# Patient Record
Sex: MALE | Race: WHITE | Employment: UNEMPLOYED | URBAN - METROPOLITAN AREA
[De-identification: names, ages, dates, MRNs, and addresses within clinical notes are randomized per-mention and may not be internally consistent; named-entity substitution may affect disease eponyms.]

---

## 2018-02-20 LAB
ALBUMIN SERPL-MCNC: NORMAL G/DL
ALP BLD-CCNC: NORMAL U/L
ALT SERPL-CCNC: NORMAL U/L
ANION GAP SERPL CALCULATED.3IONS-SCNC: NORMAL MMOL/L
AST SERPL-CCNC: NORMAL U/L
AVERAGE GLUCOSE: NORMAL
BILIRUB SERPL-MCNC: NORMAL MG/DL
BUN BLDV-MCNC: NORMAL MG/DL
CALCIUM SERPL-MCNC: NORMAL MG/DL
CHLORIDE BLD-SCNC: NORMAL MMOL/L
CHOLESTEROL, TOTAL: NORMAL
CHOLESTEROL/HDL RATIO: NORMAL
CO2: NORMAL
CREAT SERPL-MCNC: NORMAL MG/DL
CREATININE, URINE: 219
GFR CALCULATED: NORMAL
GLUCOSE BLD-MCNC: NORMAL MG/DL
HBA1C MFR BLD: 9.1 %
HDLC SERPL-MCNC: NORMAL MG/DL
LDL CHOLESTEROL CALCULATED: NORMAL
MICROALBUMIN/CREAT 24H UR: 56.5 MG/G{CREAT}
MICROALBUMIN/CREAT UR-RTO: 258
POTASSIUM SERPL-SCNC: NORMAL MMOL/L
SODIUM BLD-SCNC: NORMAL MMOL/L
TOTAL PROTEIN: NORMAL
TRIGL SERPL-MCNC: NORMAL MG/DL
VLDLC SERPL CALC-MCNC: NORMAL MG/DL

## 2018-03-05 LAB — DIABETIC RETINOPATHY: POSITIVE

## 2020-01-29 RX ORDER — TRIAMCINOLONE ACETONIDE 1 MG/G
CREAM TOPICAL 2 TIMES DAILY
Status: ON HOLD | COMMUNITY
Start: 2017-01-24 | End: 2021-07-16

## 2020-01-29 RX ORDER — LISINOPRIL 5 MG/1
1 TABLET ORAL DAILY
Status: ON HOLD | COMMUNITY
Start: 2018-02-27 | End: 2021-07-22 | Stop reason: HOSPADM

## 2020-01-29 RX ORDER — GABAPENTIN 300 MG/1
1 CAPSULE ORAL NIGHTLY
COMMUNITY
Start: 2017-01-24 | End: 2021-05-28

## 2020-01-29 RX ORDER — PEN NEEDLE, DIABETIC 29 G X1/2"
NEEDLE, DISPOSABLE MISCELLANEOUS
Status: ON HOLD | COMMUNITY
Start: 2017-11-27 | End: 2021-07-22 | Stop reason: SDUPTHER

## 2021-02-24 ENCOUNTER — TELEPHONE (OUTPATIENT)
Dept: ORTHOPEDIC SURGERY | Age: 41
End: 2021-02-24

## 2021-07-16 ENCOUNTER — HOSPITAL ENCOUNTER (INPATIENT)
Age: 41
LOS: 6 days | Discharge: HOME | DRG: 192 | End: 2021-07-22
Attending: INTERNAL MEDICINE | Admitting: INTERNAL MEDICINE
Payer: COMMERCIAL

## 2021-07-16 DIAGNOSIS — E10.9 TYPE 1 DIABETES MELLITUS WITHOUT COMPLICATION (HCC): Primary | ICD-10-CM

## 2021-07-16 PROBLEM — I50.23 HEART FAILURE, SYSTOLIC, WITH ACUTE DECOMPENSATION (HCC): Status: ACTIVE | Noted: 2021-07-16

## 2021-07-16 PROBLEM — I50.9 ACUTE DECOMPENSATED HEART FAILURE (HCC): Status: ACTIVE | Noted: 2021-07-16

## 2021-07-16 PROCEDURE — 2060000000 HC ICU INTERMEDIATE R&B

## 2021-07-16 RX ORDER — ONDANSETRON 2 MG/ML
4 INJECTION INTRAMUSCULAR; INTRAVENOUS EVERY 6 HOURS PRN
Status: DISCONTINUED | OUTPATIENT
Start: 2021-07-16 | End: 2021-07-22 | Stop reason: HOSPADM

## 2021-07-16 RX ORDER — SODIUM CHLORIDE 0.9 % (FLUSH) 0.9 %
10 SYRINGE (ML) INJECTION PRN
Status: DISCONTINUED | OUTPATIENT
Start: 2021-07-16 | End: 2021-07-22 | Stop reason: HOSPADM

## 2021-07-16 RX ORDER — DEXTROSE MONOHYDRATE 50 MG/ML
100 INJECTION, SOLUTION INTRAVENOUS PRN
Status: DISCONTINUED | OUTPATIENT
Start: 2021-07-16 | End: 2021-07-22 | Stop reason: HOSPADM

## 2021-07-16 RX ORDER — ACETAMINOPHEN 325 MG/1
650 TABLET ORAL EVERY 6 HOURS PRN
Status: DISCONTINUED | OUTPATIENT
Start: 2021-07-16 | End: 2021-07-22 | Stop reason: HOSPADM

## 2021-07-16 RX ORDER — SODIUM CHLORIDE 0.9 % (FLUSH) 0.9 %
5-40 SYRINGE (ML) INJECTION EVERY 12 HOURS SCHEDULED
Status: DISCONTINUED | OUTPATIENT
Start: 2021-07-17 | End: 2021-07-22 | Stop reason: HOSPADM

## 2021-07-16 RX ORDER — DEXTROSE MONOHYDRATE 25 G/50ML
12.5 INJECTION, SOLUTION INTRAVENOUS PRN
Status: DISCONTINUED | OUTPATIENT
Start: 2021-07-16 | End: 2021-07-22 | Stop reason: HOSPADM

## 2021-07-16 RX ORDER — NICOTINE POLACRILEX 4 MG
15 LOZENGE BUCCAL PRN
Status: DISCONTINUED | OUTPATIENT
Start: 2021-07-16 | End: 2021-07-22 | Stop reason: HOSPADM

## 2021-07-16 RX ORDER — ACETAMINOPHEN 650 MG/1
650 SUPPOSITORY RECTAL EVERY 6 HOURS PRN
Status: DISCONTINUED | OUTPATIENT
Start: 2021-07-16 | End: 2021-07-22 | Stop reason: HOSPADM

## 2021-07-16 RX ORDER — INSULIN GLARGINE 100 [IU]/ML
35 INJECTION, SOLUTION SUBCUTANEOUS EVERY MORNING
Status: DISCONTINUED | OUTPATIENT
Start: 2021-07-17 | End: 2021-07-22 | Stop reason: HOSPADM

## 2021-07-16 RX ORDER — INSULIN GLARGINE 100 [IU]/ML
45 INJECTION, SOLUTION SUBCUTANEOUS EVERY MORNING
COMMUNITY

## 2021-07-16 RX ORDER — METOPROLOL SUCCINATE 25 MG/1
25 TABLET, EXTENDED RELEASE ORAL DAILY
Status: DISCONTINUED | OUTPATIENT
Start: 2021-07-17 | End: 2021-07-17

## 2021-07-16 RX ORDER — SODIUM CHLORIDE 9 MG/ML
25 INJECTION, SOLUTION INTRAVENOUS PRN
Status: DISCONTINUED | OUTPATIENT
Start: 2021-07-16 | End: 2021-07-22 | Stop reason: HOSPADM

## 2021-07-16 RX ORDER — ONDANSETRON 4 MG/1
4 TABLET, ORALLY DISINTEGRATING ORAL EVERY 8 HOURS PRN
Status: DISCONTINUED | OUTPATIENT
Start: 2021-07-16 | End: 2021-07-22 | Stop reason: HOSPADM

## 2021-07-17 ENCOUNTER — APPOINTMENT (OUTPATIENT)
Dept: GENERAL RADIOLOGY | Age: 41
DRG: 192 | End: 2021-07-17
Attending: INTERNAL MEDICINE
Payer: COMMERCIAL

## 2021-07-17 PROBLEM — B34.8 PARAINFLUENZA VIRUS INFECTION: Status: ACTIVE | Noted: 2021-07-17

## 2021-07-17 PROBLEM — I42.9 CARDIOMYOPATHY (HCC): Status: ACTIVE | Noted: 2021-07-17

## 2021-07-17 PROBLEM — B34.8 RHINOVIRUS INFECTION: Status: ACTIVE | Noted: 2021-07-17

## 2021-07-17 PROBLEM — Z72.0 TOBACCO ABUSE: Status: ACTIVE | Noted: 2021-07-17

## 2021-07-17 LAB
ANION GAP SERPL CALCULATED.3IONS-SCNC: 11 MMOL/L (ref 7–16)
BUN BLDV-MCNC: 30 MG/DL (ref 6–20)
CALCIUM SERPL-MCNC: 8 MG/DL (ref 8.6–10.2)
CHLORIDE BLD-SCNC: 96 MMOL/L (ref 98–107)
CHOLESTEROL, TOTAL: 110 MG/DL (ref 0–199)
CO2: 27 MMOL/L (ref 22–29)
CREAT SERPL-MCNC: 1.1 MG/DL (ref 0.7–1.2)
GFR AFRICAN AMERICAN: >60
GFR NON-AFRICAN AMERICAN: >60 ML/MIN/1.73
GLUCOSE BLD-MCNC: 268 MG/DL (ref 74–99)
HBA1C MFR BLD: 8.5 % (ref 4–5.6)
HDLC SERPL-MCNC: 29 MG/DL
LDL CHOLESTEROL CALCULATED: 66 MG/DL (ref 0–99)
MAGNESIUM: 1.9 MG/DL (ref 1.6–2.6)
METER GLUCOSE: 136 MG/DL (ref 74–99)
METER GLUCOSE: 240 MG/DL (ref 74–99)
METER GLUCOSE: 251 MG/DL (ref 74–99)
METER GLUCOSE: 338 MG/DL (ref 74–99)
METER GLUCOSE: 384 MG/DL (ref 74–99)
POTASSIUM SERPL-SCNC: 4.3 MMOL/L (ref 3.5–5)
SODIUM BLD-SCNC: 134 MMOL/L (ref 132–146)
TRIGL SERPL-MCNC: 74 MG/DL (ref 0–149)
TSH SERPL DL<=0.05 MIU/L-ACNC: 1.29 UIU/ML (ref 0.27–4.2)
VLDLC SERPL CALC-MCNC: 15 MG/DL

## 2021-07-17 PROCEDURE — 83036 HEMOGLOBIN GLYCOSYLATED A1C: CPT

## 2021-07-17 PROCEDURE — 2060000000 HC ICU INTERMEDIATE R&B

## 2021-07-17 PROCEDURE — 71046 X-RAY EXAM CHEST 2 VIEWS: CPT

## 2021-07-17 PROCEDURE — 2500000003 HC RX 250 WO HCPCS: Performed by: INTERNAL MEDICINE

## 2021-07-17 PROCEDURE — 2580000003 HC RX 258: Performed by: INTERNAL MEDICINE

## 2021-07-17 PROCEDURE — 6370000000 HC RX 637 (ALT 250 FOR IP): Performed by: INTERNAL MEDICINE

## 2021-07-17 PROCEDURE — 80061 LIPID PANEL: CPT

## 2021-07-17 PROCEDURE — 84443 ASSAY THYROID STIM HORMONE: CPT

## 2021-07-17 PROCEDURE — 36415 COLL VENOUS BLD VENIPUNCTURE: CPT

## 2021-07-17 PROCEDURE — 83735 ASSAY OF MAGNESIUM: CPT

## 2021-07-17 PROCEDURE — 6360000002 HC RX W HCPCS: Performed by: INTERNAL MEDICINE

## 2021-07-17 PROCEDURE — 80048 BASIC METABOLIC PNL TOTAL CA: CPT

## 2021-07-17 PROCEDURE — 82962 GLUCOSE BLOOD TEST: CPT

## 2021-07-17 RX ORDER — SPIRONOLACTONE 25 MG/1
25 TABLET ORAL DAILY
Status: DISCONTINUED | OUTPATIENT
Start: 2021-07-17 | End: 2021-07-22 | Stop reason: HOSPADM

## 2021-07-17 RX ORDER — FUROSEMIDE 10 MG/ML
20 INJECTION INTRAMUSCULAR; INTRAVENOUS DAILY
Status: DISCONTINUED | OUTPATIENT
Start: 2021-07-18 | End: 2021-07-17

## 2021-07-17 RX ORDER — CARVEDILOL 6.25 MG/1
6.25 TABLET ORAL 2 TIMES DAILY WITH MEALS
Status: DISCONTINUED | OUTPATIENT
Start: 2021-07-17 | End: 2021-07-21

## 2021-07-17 RX ORDER — BUMETANIDE 0.25 MG/ML
1 INJECTION, SOLUTION INTRAMUSCULAR; INTRAVENOUS 2 TIMES DAILY
Status: DISCONTINUED | OUTPATIENT
Start: 2021-07-17 | End: 2021-07-20

## 2021-07-17 RX ADMIN — INSULIN GLARGINE 35 UNITS: 100 INJECTION, SOLUTION SUBCUTANEOUS at 08:52

## 2021-07-17 RX ADMIN — ONDANSETRON 4 MG: 2 INJECTION INTRAMUSCULAR; INTRAVENOUS at 21:29

## 2021-07-17 RX ADMIN — CARVEDILOL 6.25 MG: 6.25 TABLET, FILM COATED ORAL at 08:52

## 2021-07-17 RX ADMIN — METOPROLOL SUCCINATE 25 MG: 25 TABLET, EXTENDED RELEASE ORAL at 00:08

## 2021-07-17 RX ADMIN — BUMETANIDE 1 MG: 0.25 INJECTION, SOLUTION INTRAMUSCULAR; INTRAVENOUS at 21:23

## 2021-07-17 RX ADMIN — INSULIN LISPRO 4 UNITS: 100 INJECTION, SOLUTION INTRAVENOUS; SUBCUTANEOUS at 21:23

## 2021-07-17 RX ADMIN — Medication 10 ML: at 08:59

## 2021-07-17 RX ADMIN — INSULIN LISPRO 10 UNITS: 100 INJECTION, SOLUTION INTRAVENOUS; SUBCUTANEOUS at 16:55

## 2021-07-17 RX ADMIN — CARVEDILOL 6.25 MG: 6.25 TABLET, FILM COATED ORAL at 16:54

## 2021-07-17 RX ADMIN — SPIRONOLACTONE 25 MG: 25 TABLET ORAL at 11:26

## 2021-07-17 RX ADMIN — INSULIN LISPRO 6 UNITS: 100 INJECTION, SOLUTION INTRAVENOUS; SUBCUTANEOUS at 11:26

## 2021-07-17 RX ADMIN — ENOXAPARIN SODIUM 40 MG: 100 INJECTION SUBCUTANEOUS at 08:51

## 2021-07-17 RX ADMIN — INSULIN LISPRO 3 UNITS: 100 INJECTION, SOLUTION INTRAVENOUS; SUBCUTANEOUS at 06:44

## 2021-07-17 RX ADMIN — BUMETANIDE 1 MG: 0.25 INJECTION, SOLUTION INTRAMUSCULAR; INTRAVENOUS at 08:52

## 2021-07-17 NOTE — PROGRESS NOTES
DAILY PROGRESS NOTE - THE HEART CENTER    SUBJECTIVE:    He is being followed for acute systolic heart failure. He was admitted to [facility] last week with worsening dyspnea, orthopnea, PND and had pulmonary edema on chest x-ray. Subsequent echocardiogram showed severe left ventricular dilatation with ejection fraction 10-15% with moderate mitral regurgitation. He was placed on carvedilol for cardioprotection along with spironolactone with plans to switch his existing lisinopril to [place]. Was receiving IV bumetanide at [facility] with unknown amount of diuresis. He received a LifeVest yesterday 7/16. Feeling well at this time and with much less dyspnea. Sinus rhythm on telemetry. Of note, he did have Lexiscan at [facility] which showed extensive anteroseptal thinning with EF of 18%. No ischemia. Hypertension, longstanding insulin requiring diabetes mellitus for over 20 years. Questionable hyperlipidemia. OBJECTIVE:    His vital signs were reviewed today. Vitals:    07/17/21 0333   BP: 123/81   Pulse: 91   Resp: 22   Temp: 97.1 °F (36.2 °C)   SpO2: 92%       Scheduled Meds:   [START ON 7/18/2021] furosemide  20 mg Intravenous Daily    insulin glargine  35 Units Subcutaneous QAM    metoprolol succinate  25 mg Oral Daily    sodium chloride flush  5-40 mL Intravenous 2 times per day    enoxaparin  40 mg Subcutaneous Daily    insulin lispro  0-12 Units Subcutaneous TID WC    insulin lispro  0-6 Units Subcutaneous Nightly     Continuous Infusions:   sodium chloride      dextrose       PRN Meds:.sodium chloride flush, sodium chloride, ondansetron **OR** ondansetron, magnesium hydroxide, acetaminophen **OR** acetaminophen, glucose, dextrose, glucagon (rDNA), dextrose    REVIEW OF SYSTEMS:     No pruritus, rash, bruising. Cardiac symptoms per HPI. No nausea, vomiting, abdominal pain, GI bleeding, change in bowel habits.   No dysuria, urinary frequency, urgency, hematuria, flank pain.  Joint pain but no muscle soreness, stiffness, aching. No headache, speech disturbance, lateralizing neurologic deficit. No hemoptysis, epistaxis, easy bruising. No anxiety, depression. No symptoms of hypothyroidism, hyperthyroidism, diabetes, heat or cold intolerance. FAMILY HISTORY: Negative for CAD in first-degree relatives. SOCIAL HISTORY: Negative for alcohol, tobacco, or illicit drug use. PHYSICAL EXAM:    General Appearance:  awake, alert, oriented, in no acute distress  Neck:  no bruits  Lungs:  Normal expansion. Coarse breath sounds at bases bilaterally and faint crackles to auscultation. No rales, rhonchi, or wheezing. Heart:  Heart sounds are normal.  Regular rate and rhythm without murmur. Soft apical heave palpated and S3 heard. No murmurs. Abdomen:  Soft, non-tender. Extremities: Extremities warm to touch, pink, with no edema. Neuro/musculosketal:  Unremarkable. LABS:    Recent Labs     07/15/21  0406 07/16/21  0634   * 133*   CREATININE 1.2 1.2       Recent Labs     07/15/21  0406 07/16/21  0634   HGB 14.3 13.8       No results for input(s): INR in the last 72 hours. IMPRESSION:    #1.  Acute systolic heart failure-was on IV bumetanide 2 mg twice daily at [facility] with unknown diuresis. Change to IV bumetanide 1 mg IV twice daily. Strict urine output and daily weights. Chest x-ray this morning. #2. Severe cardiomyopathy-change metoprolol to carvedilol 6.25 mg twice daily. Start Entresto 24/26 mg twice daily tomorrow, as he will have been off of lisinopril for 36 hours. Start spironolactone 25 mg daily at noon. Plan [place] as outpatient. Probably will need right and left heart catheterization while admitted. #3. Hypertension-controlled. Medication changes as above. #4.  Diabetes-per hospitalist.    #5.  Continue to follow.

## 2021-07-17 NOTE — H&P
Hospital Medicine History & Physical      PCP: Radha Vásquez MD    Date of Admission: 7/16/2021    Date of Service: Pt seen/examined on 7/16 and Admitted to Inpatient with expected LOS greater than two midnights due to medical therapy. Chief Complaint: Cardiomyopathy      History Of Present Illness:   47 y.o. male who presented to [facility] with cardiomyopathy. Patient is transferred from [facility] after admission for CHF. Patient notes that over the last week he has had worsening shortness of breath. Associated with cough productive of clear sputum.  + Stuffy nose and sore throat. Similar to previous colds. Patient does note progressively worsening shortness of breath. Associated with leg swelling.  + Orthopnea and PND. Patient was admitted to \[facility]". He was evaluated by Cardiology and diuresed. Symptoms were moderate in severity and are largely improved. Echocardiogram reveals dilated LV with EF 10 to 15%. Patient is transferred to [address] [place] [address] for further evaluation and treatment. Patient denies family history of CHF or early CAD. He has remote history of methamphetamine use but denies any further use in the last couple years. Patient continues to smoke. He denies alcohol abuse. Past Medical History:          Diagnosis Date    Arthritis     hand, wrists    Bleeding of eye     right    CHF (congestive heart failure) (HCC)     Diabetes ([facility]     Hypertension     Neuropathy in diabetes ([facility]     legs       Past Surgical History:      No past surgical history on file. Medications Prior to Admission:      Prior to Admission medications    Medication Sig Start Date End Date Taking?  Authorizing Provider   insulin glargine (LANTUS) 100 UNIT/ML injection vial Inject 45 Units into the skin every morning   Yes Historical Provider, MD   NOVOLOG 100 UNIT/ML injection vial 3 times daily (before meals) Sliding scale 2-3 times daily after checking glucose 11/10/17 Yes Historical Provider, MD   VENTOLIN  (90 Base) MCG/ACT inhaler inhale 2 puffs by mouth every 4 to 6 hours if needed 11/1/17  Yes Historical Provider, MD   Insulin Pen Needle (PEN NEEDLES 29GX1/2\") 29G X 12MM MISC Use seven times daily with quickpen 11/27/17   Historical Provider, MD   lisinopril (PRINIVIL;ZESTRIL) 5 MG tablet Take 1 tablet by mouth daily 2/27/18   Historical Provider, MD XAVIER ULTRAFINE III SHORT PEN 31G X 8 MM MISC  11/27/17   Historical Provider, MD XAVIER INS SYRINGE 0.5CC/31GX5/16 31G X 5/16\" 0.5 ML MISC  8/31/17   Historical Provider, MD   FREESTYLE LITE strip  11/25/17   Historical Provider, MD   FREESTYLE LANCETS MISC TEST 6 TIMES A DAY 11/20/17   Historical Provider, MD   ibuprofen (ADVIL;MOTRIN) 200 MG tablet Take 200 mg by mouth every 6 hours as needed for Pain    Historical Provider, MD       Allergies:  Patient has no known allergies. Social History:      The patient currently lives home    TOBACCO:   reports that he has been smoking cigarettes. He started smoking about 23 years ago. He has a 10.00 pack-year smoking history. He has never used smokeless tobacco.  ETOH:   reports no history of alcohol use. Family History:      No CHF or early CAD        Problem Relation Age of Onset    Other Mother     Other Father     No Known Problems Sister     No Known Problems Brother        REVIEW OF SYSTEMS:   Pertinent positives as noted in the HPI. All other systems reviewed and negative. PHYSICAL EXAM:    /74   Pulse 87   Temp 98.3 °F (36.8 °C) (Temporal)   Resp 16   Ht 6' 1\" (1.854 m)   Wt 162 lb 4.8 oz (73.6 kg)   SpO2 95%   BMI 21.41 kg/m²     General appearance:  No apparent distress, appears stated age and cooperative. HEENT:  Normal cephalic, atraumatic without obvious deformity. Pupils equal, round, and reactive to light. Extra ocular muscles intact. Conjunctivae/corneas clear. Neck: Supple, with full range of motion. No jugular venous distention. Trachea midline. Respiratory:  Normal respiratory effort. Clear to auscultation, bilaterally without Rales/Wheezes/Rhonchi. Cardiovascular:  Regular rate and rhythm with normal S1/S2 without murmurs, rubs or gallops. Abdomen: Soft, non-tender, non-distended with normal bowel sounds. Musculoskeletal:  No clubbing, cyanosis or edema bilaterally. Full range of motion without deformity. Skin: Skin color, texture, turgor normal.  No rashes or lesions. Neurologic:  Neurovascularly intact without any focal sensory/motor deficits. Cranial nerves: II-XII intact, grossly non-focal.  Psychiatric:  Alert and oriented, thought content appropriate, normal insight    CXR:  I have reviewed the CXR with the following interpretation: Mild pulmonary vascular congestion  EKG:  I have reviewed the EKG with the following interpretation: NSR    Labs:     Recent Labs     07/15/21  0406 07/16/21  0634   WBC 7.2 7.2   HGB 14.3 13.8   HCT 42.1 40.3*    211     Recent Labs     07/15/21  0406 07/16/21  0634   * 133*   K 5.1* 4.9   CL 99 97*   CO2 25 28   BUN 25* 30*   CREATININE 1.2 1.2   CALCIUM 8.0* 8.0*     Recent Labs     07/15/21  0406 07/16/21  0634   AST 32 27   ALT 27 25   BILITOT 1.4 0.6   ALKPHOS 152* 135*     No results for input(s): INR in the last 72 hours.   Recent Labs     07/15/21  0406 07/15/21  0928   TROPONINI 0.04* 0.04*       Urinalysis:      Lab Results   Component Value Date    WBCUA NEGATIVE 07/15/2021    RBCUA MODERATE 07/15/2021    GLUCOSEU NEGATIVE 07/15/2021         ASSESSMENT:    Active Hospital Problems    Diagnosis Date Noted    Cardiomyopathy [facility]) [I42.9] 07/17/2021    Parainfluenza virus infection [B34.8] 07/17/2021    Rhinovirus infection [B34.8] 07/17/2021    Tobacco abuse [Z72.0] 07/17/2021    Heart failure, systolic, with acute decompensation ([facility] [I50.23] 07/16/2021    Type 1 diabetes mellitus ([address] [E10.9]     Hypertension [I10]        PLAN:  82-year-old male history of CHF, type 1 diabetes, tobacco abuse transferred from [facility] with    Acute on chronic systolic CHF/dilated cardiomyopathy  -Possible ischemic versus viral versus idiopathic  - relatively euvolemic  Admit monitored bed  IV Lasix  Monitor I's and O's  Daily weights  Monitor labs closely   Echo dilated LV EF 10-15%  Cardiology consult--needs left heart cath  LifeVest     DM Nadege, CHANTELL, A1c, SSI      Tobacco abuse  - tobacco cessation counseled     Viral URI  Isolation per protocol  Supportive care    DVT Prophylaxis: LMWH  Diet: ADULT DIET; Regular; 4 carb choices (60 gm/meal); Low Sodium (2 gm)  Code Status: Full Code    PT/OT Eval Status: ordered     Dispo -requires inpatient admission       Javan Sarabia MD    Thank you Nandini Fitch MD for the opportunity to be involved in this patient's care. If you have any questions or concerns please feel free to contact me through [address] [place] [address].   Electronically signed by Javan Sarabia MD on 7/16/2021 at 11:38 PM

## 2021-07-17 NOTE — PLAN OF CARE
Problem: Falls - Risk of:  Goal: Will remain free from falls  Description: Will remain free from falls  Outcome: Met This Shift  Goal: Absence of physical injury  Description: Absence of physical injury  Outcome: Met This Shift     Problem: Cardiac:  Goal: Ability to maintain vital signs within normal range will improve  Description: Ability to maintain vital signs within normal range will improve  Outcome: Met This Shift  Goal: Cardiovascular alteration will improve  Description: Cardiovascular alteration will improve  Outcome: Met This Shift     Problem: Health Behavior:  Goal: Will modify at least one risk factor affecting health status  Description: Will modify at least one risk factor affecting health status  Outcome: Met This Shift  Goal: Identification of resources available to assist in meeting health care needs will improve  Description: Identification of resources available to assist in meeting health care needs will improve  Outcome: Met This Shift     Problem: Physical Regulation:  Goal: Complications related to the disease process, condition or treatment will be avoided or minimized  Description: Complications related to the disease process, condition or treatment will be avoided or minimized  Outcome: Met This Shift     Problem: OXYGENATION/RESPIRATORY FUNCTION  Goal: Patient will maintain patent airway  Outcome: Met This Shift  Goal: Patient will achieve/maintain normal respiratory rate/effort  Description: Respiratory rate and effort will be within normal limits for the patient  Outcome: Met This Shift     Problem: HEMODYNAMIC STATUS  Goal: Patient has stable vital signs and fluid balance  Outcome: Met This Shift     Problem: FLUID AND ELECTROLYTE IMBALANCE  Goal: Fluid and electrolyte balance are achieved/maintained  Outcome: Met This Shift

## 2021-07-17 NOTE — PLAN OF CARE
Problem: Falls - Risk of:  Goal: Will remain free from falls  Description: Will remain free from falls  7/17/2021 1049 by Elva Cronin  Outcome: Met This Shift  7/17/2021 0125 by Ivon Shaffer RN  Outcome: Met This Shift  Goal: Absence of physical injury  Description: Absence of physical injury  7/17/2021 1049 by Elva Cronin  Outcome: Met This Shift  7/17/2021 0125 by Ivon Shaffer RN  Outcome: Met This Shift     Problem: Cardiac:  Goal: Cardiovascular alteration will improve  Description: Cardiovascular alteration will improve  7/17/2021 1049 by Elva Cronin  Outcome: Ongoing  7/17/2021 0125 by Ivon Shaffer RN  Outcome: Met This Shift

## 2021-07-17 NOTE — PROGRESS NOTES
Hospitalist Progress Note      SYNOPSIS: Patient admitted on 2021 for CHF  47 y.o. male who presented to [facility] with cardiomyopathy. Patient is transferred from [facility] after admission for CHF. Patient notes that over the last week he has had worsening shortness of breath. Associated with leg swelling.  + Orthopnea and PND. He was evaluated by Cardiology and diuresed. Echocardiogram reveals dilated LV with EF 10 to 15%. Patient is transferred to [address] [place] [address] for further evaluation and treatment. Patient denies family history of CHF or early CAD. He has remote history of methamphetamine, cocaine and Etoh use use but denies any further use in the last couple years. SUBJECTIVE:    Patient seen and examined  Denied any chest pain or worsening shortness of breath  Leg swelling significantly improved  No PND and orthopnea  No acute events overnight  Records reviewed. Stable overnight. No other overnight issues reported. Temp (24hrs), Av.9 °F (36.1 °C), Min:96.1 °F (35.6 °C), Max:98.3 °F (36.8 °C)    DIET: ADULT DIET; Regular; 4 carb choices (60 gm/meal); Low Sodium (2 gm)  CODE: Full Code    Intake/Output Summary (Last 24 hours) at 2021 1301  Last data filed at 2021 1229  Gross per 24 hour   Intake --   Output 750 ml   Net -750 ml       OBJECTIVE:    /80   Pulse 85   Temp 96.2 °F (35.7 °C) (Oral)   Resp 18   Ht 6' 1\" (1.854 m)   Wt 162 lb 4.8 oz (73.6 kg)   SpO2 96%   BMI 21.41 kg/m²     General appearance: No apparent distress, appears stated age and cooperative. HEENT:  Conjunctivae/corneas clear. Neck: Supple. No jugular venous distention. Respiratory: Clear to auscultation bilaterally, normal respiratory effort  Cardiovascular: Regular rate rhythm, normal S1-S2  Abdomen: Soft, nontender, nondistended  Musculoskeletal: No clubbing, cyanosis, no bilateral lower extremity edema. Brisk capillary refill.    Skin:  No rashes  on visible skin  Neurologic: awake, alert and following commands     ASSESSMENT AND PLAN:  1) Acute on chronic systolic CHF/dilated cardiomyopathy  -Due to Ischemic versus nonischemic cardiomyopathy  -TTE from the referring hospital showed EF of 10 to 15% with moderate mitral regurgitation  -Status post Lexiscan in [facility] which showed extensive anteroseptal septal thinning with EF of 18%.   No ischemia  -Status post lifevest placement  -Symptoms improved with diuresis  -Cardiology on board, started on carvedilol 6.25 mg twice daily, Aldactone 25 daily and Entresto to be started tomorrow after waiting more than 36 hours of lisinopril wash off  -Discussed with patient about the need for LHC during this admission; he agreed         2) DM type 2  -Continue basal, supplemental and prandial insulin coverage  -Farxiga to be started as OP due to additive benefit in patients with heart failure     3) Nicotine use disorder  -Counseled to quit          DISPOSITION: Home    Medications:  REVIEWED DAILY    Infusion Medications    sodium chloride      dextrose       Scheduled Medications    bumetanide  1 mg Intravenous BID    carvedilol  6.25 mg Oral BID WC    spironolactone  25 mg Oral Daily    insulin glargine  35 Units Subcutaneous QAM    sodium chloride flush  5-40 mL Intravenous 2 times per day    enoxaparin  40 mg Subcutaneous Daily    insulin lispro  0-12 Units Subcutaneous TID WC    insulin lispro  0-6 Units Subcutaneous Nightly     PRN Meds: sodium chloride flush, sodium chloride, ondansetron **OR** ondansetron, magnesium hydroxide, acetaminophen **OR** acetaminophen, glucose, dextrose, glucagon (rDNA), dextrose    Labs:     Recent Labs     07/15/21  0406 07/16/21  0634   WBC 7.2 7.2   HGB 14.3 13.8   HCT 42.1 40.3*    211       Recent Labs     07/15/21  0406 07/16/21  0634 07/17/21  0518   * 133* 134   K 5.1* 4.9 4.3   CL 99 97* 96*   CO2 25 28 27   BUN 25* 30* 30*   CREATININE 1.2 1.2 1.1 CALCIUM 8.0* 8.0* 8.0*       Recent Labs     07/15/21  0406 07/16/21  0634   PROT 6.0 5.8*   ALKPHOS 152* 135*   ALT 27 25   AST 32 27   BILITOT 1.4 0.6       No results for input(s): INR in the last 72 hours. Recent Labs     07/15/21  0406 07/15/21  0928   TROPONINI 0.04* 0.04*       Chronic labs:    Lab Results   Component Value Date    CHOL 110 07/17/2021    TRIG 74 07/17/2021    HDL 29 07/17/2021    LDLCALC 66 07/17/2021    TSH 1.290 07/17/2021    LABA1C 8.5 (H) 07/17/2021       Radiology: REVIEWED DAILY    +++++++++++++++++++++++++++++++++++++++++++++++++  Manpreet Bautista MD  [facility] Physician - [address] [place] [address]  +++++++++++++++++++++++++++++++++++++++++++++++++  NOTE: This report was transcribed using voice recognition software. Every effort was made to ensure accuracy; however, inadvertent computerized transcription errors may be present.

## 2021-07-18 LAB
ANION GAP SERPL CALCULATED.3IONS-SCNC: 8 MMOL/L (ref 7–16)
BUN BLDV-MCNC: 28 MG/DL (ref 6–20)
CALCIUM SERPL-MCNC: 8.1 MG/DL (ref 8.6–10.2)
CHLORIDE BLD-SCNC: 97 MMOL/L (ref 98–107)
CO2: 33 MMOL/L (ref 22–29)
CREAT SERPL-MCNC: 1.2 MG/DL (ref 0.7–1.2)
GFR AFRICAN AMERICAN: >60
GFR NON-AFRICAN AMERICAN: >60 ML/MIN/1.73
GLUCOSE BLD-MCNC: 193 MG/DL (ref 74–99)
MAGNESIUM: 2 MG/DL (ref 1.6–2.6)
METER GLUCOSE: 122 MG/DL (ref 74–99)
METER GLUCOSE: 167 MG/DL (ref 74–99)
METER GLUCOSE: 167 MG/DL (ref 74–99)
METER GLUCOSE: 413 MG/DL (ref 74–99)
METER GLUCOSE: 461 MG/DL (ref 74–99)
POTASSIUM SERPL-SCNC: 4.6 MMOL/L (ref 3.5–5)
PRO-BNP: 1520 PG/ML (ref 0–125)
SODIUM BLD-SCNC: 138 MMOL/L (ref 132–146)

## 2021-07-18 PROCEDURE — 83880 ASSAY OF NATRIURETIC PEPTIDE: CPT

## 2021-07-18 PROCEDURE — 83735 ASSAY OF MAGNESIUM: CPT

## 2021-07-18 PROCEDURE — 6370000000 HC RX 637 (ALT 250 FOR IP): Performed by: INTERNAL MEDICINE

## 2021-07-18 PROCEDURE — 82962 GLUCOSE BLOOD TEST: CPT

## 2021-07-18 PROCEDURE — 36415 COLL VENOUS BLD VENIPUNCTURE: CPT

## 2021-07-18 PROCEDURE — 2580000003 HC RX 258: Performed by: INTERNAL MEDICINE

## 2021-07-18 PROCEDURE — 6360000002 HC RX W HCPCS: Performed by: INTERNAL MEDICINE

## 2021-07-18 PROCEDURE — 80048 BASIC METABOLIC PNL TOTAL CA: CPT

## 2021-07-18 PROCEDURE — 2060000000 HC ICU INTERMEDIATE R&B

## 2021-07-18 PROCEDURE — 2500000003 HC RX 250 WO HCPCS: Performed by: INTERNAL MEDICINE

## 2021-07-18 RX ADMIN — INSULIN LISPRO 2 UNITS: 100 INJECTION, SOLUTION INTRAVENOUS; SUBCUTANEOUS at 16:21

## 2021-07-18 RX ADMIN — SACUBITRIL AND VALSARTAN 1 TABLET: 24; 26 TABLET, FILM COATED ORAL at 20:50

## 2021-07-18 RX ADMIN — INSULIN GLARGINE 35 UNITS: 100 INJECTION, SOLUTION SUBCUTANEOUS at 08:31

## 2021-07-18 RX ADMIN — ENOXAPARIN SODIUM 40 MG: 100 INJECTION SUBCUTANEOUS at 08:31

## 2021-07-18 RX ADMIN — INSULIN LISPRO 20 UNITS: 100 INJECTION, SOLUTION INTRAVENOUS; SUBCUTANEOUS at 11:37

## 2021-07-18 RX ADMIN — BUMETANIDE 1 MG: 0.25 INJECTION, SOLUTION INTRAMUSCULAR; INTRAVENOUS at 20:50

## 2021-07-18 RX ADMIN — CARVEDILOL 6.25 MG: 6.25 TABLET, FILM COATED ORAL at 16:23

## 2021-07-18 RX ADMIN — INSULIN LISPRO 12 UNITS: 100 INJECTION, SOLUTION INTRAVENOUS; SUBCUTANEOUS at 11:37

## 2021-07-18 RX ADMIN — BUMETANIDE 1 MG: 0.25 INJECTION, SOLUTION INTRAMUSCULAR; INTRAVENOUS at 08:31

## 2021-07-18 RX ADMIN — Medication 10 ML: at 20:50

## 2021-07-18 RX ADMIN — Medication 10 ML: at 08:37

## 2021-07-18 RX ADMIN — INSULIN LISPRO 2 UNITS: 100 INJECTION, SOLUTION INTRAVENOUS; SUBCUTANEOUS at 06:17

## 2021-07-18 RX ADMIN — SACUBITRIL AND VALSARTAN 1 TABLET: 24; 26 TABLET, FILM COATED ORAL at 09:40

## 2021-07-18 RX ADMIN — CARVEDILOL 6.25 MG: 6.25 TABLET, FILM COATED ORAL at 08:31

## 2021-07-18 RX ADMIN — SPIRONOLACTONE 25 MG: 25 TABLET ORAL at 08:31

## 2021-07-18 RX ADMIN — INSULIN LISPRO 20 UNITS: 100 INJECTION, SOLUTION INTRAVENOUS; SUBCUTANEOUS at 16:21

## 2021-07-18 NOTE — PLAN OF CARE
Problem: Falls - Risk of:  Goal: Will remain free from falls  Description: Will remain free from falls  7/17/2021 2331 by Raghu De Oliveira RN  Outcome: Met This Shift  7/17/2021 1049 by Tiffani Rutherford  Outcome: Met This Shift  Goal: Absence of physical injury  Description: Absence of physical injury  7/17/2021 2331 by Raghu De Oliveira RN  Outcome: Met This Shift  7/17/2021 1049 by Tiffani Rutherford  Outcome: Met This Shift

## 2021-07-18 NOTE — CONSULTS
Nutrition Education    · Heart healthy diet guidelines provided to patient/attached within the discharge instructions along w/ the outpatient dietitian contact information for any additional guidance if needed.     Electronically signed by Nilsa Polanco MS, RD, LD on 7/18/21 at 10:38 AM EDT    Contact: [phone]

## 2021-07-18 NOTE — PLAN OF CARE
Problem: Falls - Risk of:  Goal: Will remain free from falls  Description: Will remain free from falls  7/18/2021 1001 by Deep Bisunshineu  Outcome: Met This Shift  7/17/2021 2331 by Erick Burton RN  Outcome: Met This Shift  Goal: Absence of physical injury  Description: Absence of physical injury  7/18/2021 1001 by [place] Bijou  Outcome: Met This Shift  7/17/2021 2331 by Erick Burton RN  Outcome: Met This Shift     Problem: Cardiac:  Goal: Ability to maintain vital signs within normal range will improve  Description: Ability to maintain vital signs within normal range will improve  7/18/2021 1001 by [place] Bijou  Outcome: Ongoing  7/17/2021 2331 by Erick Burton RN  Outcome: Met This Shift  Goal: Cardiovascular alteration will improve  Description: Cardiovascular alteration will improve  7/18/2021 1001 by Deep Liannau  Outcome: Ongoing  7/17/2021 2331 by Erick Burton RN  Outcome: Met This Shift

## 2021-07-18 NOTE — DISCHARGE INSTR - DIET
 Good nutrition is important when healing from an illness, injury, or surgery. Follow any nutrition recommendations given to you during your hospital stay.  If you were given an oral nutrition supplement while in the hospital, continue to take this supplement at home. You can take it with meals, in-between meals, and/or before bedtime. These supplements can be purchased at most local grocery stores, pharmacies, and chain super-stores.  If you have any questions about your diet or nutrition, call the hospital and ask for the dietitian. Outpatient dietitian  [phone]      A heart-healthy and consistent carbohydrate diet is recommended to manage heart disease and diabetes. To follow a heart-healthy and consistent carbohydrate diet,    Eat a balanced diet with whole grains, fruits and vegetables, and lean protein sources. Choose heart-healthy unsaturated fats. Limit saturated fats, trans fats, and cholesterol intake. Eat more plant-based or vegetarian meals using beans and soy foods for protein. Eat whole, unprocessed foods to limit the amount of sodium (salt) you eat. Choose a consistent amount of carbohydrate at each meal and snack. Limit refined carbohydrates especially sugar, sweets and sugar-sweetened beverages. If you drink alcohol, do so in moderation: one serving per day (women) and two servings per day (men). o One serving is equivalent to 12 ounces beer, 5 ounces wine, or 1.5 ounces distilled spirits    Tips  Tips for Choosing Heart-Healthy Fats  Choose lean protein and low-fat dairy foods to reduce saturated fat intake. Saturated fat is usually found in animal-based protein and is associated with certain health risks. Saturated fat is the biggest contributor to raise low-density lipoprotein (LDL) cholesterol levels. Research shows that limiting saturated fat lowers unhealthy cholesterol levels. Eat no more than 7% of your total calories each day from saturated fat.  Ask your RDN to help you determine how much saturated fat is right for you. There are many foods that do not contain large amounts of saturated fats. Swapping these foods to replace foods high in saturated fats will help you limit the saturated fat you eat and improve your cholesterol levels. You can also try eating more plant-based or vegetarian meals. Instead of    Try:    Whole milk, cheese, yogurt, and ice cream    1% or skim milk, low-fat cheese, non-fat yogurt, and low-fat ice cream    Fatty, marbled beef and pork    Lean beef, pork, or venison    Poultry with skin    Poultry without skin    Butter, stick margarine    Reduced-fat, whipped, or liquid spreads    Coconut oil, palm oil    Liquid vegetable oils: corn, canola, olive, soybean and safflower oils      Avoid foods that contain trans fats. Trans fats increase levels of LDL-cholesterol. Hydrogenated fat in processed foods is the main source of trans fats in foods. Trans fats can be found in stick margarine, shortening, processed sweets, baked goods, some fried foods, and packaged foods made with hydrogenated oils. Avoid foods with partially hydrogenated oil on the ingredient list such as: cookies, pastries, baked goods, biscuits, crackers, microwave popcorn, and frozen dinners. Choose foods with heart healthy fats. Polyunsaturated and monounsaturated fat are unsaturated fats that may help lower your blood cholesterol level when used in place of saturated fat in your diet. Ask your RDN about taking a dietary supplement with plant sterols and stanols to help lower your cholesterol level. Research shows that substituting saturated fats with unsaturated fats is beneficial to cholesterol levels. Try these easy swaps:        Instead of    Try:    Butter, stick margarine, or solid shortening    Reduced-fat, whipped, or liquid spreads    Beef, pork, or poultry with skin         Fish and seafood    Chips, crackers, snack foods    Raw or unsalted nuts and seeds or nut butters    Hummus with vegetables    Avocado on toast    Coconut oil, palm oil    Liquid vegetable oils: corn, canola, olive, soybean and safflower oils         Limit the amount of cholesterol you eat to less than 200 milligrams per day. Cholesterol is a substance carried through the bloodstream via lipoproteins, which are known as transporters of fat. Some body functions need cholesterol to work properly, but too much cholesterol in the bloodstream can damage arteries and build up blood vessel linings (which can lead to heart attack and stroke). You should eat less than 200 milligrams cholesterol per day. People respond differently to eating cholesterol. There is no test available right now that can figure out which people will respond more to dietary cholesterol and which will respond less. For individuals with high intake of dietary cholesterol, different types of increase (none, small, moderate, large) in LDL-cholesterol levels are all possible. Food sources of cholesterol include egg yolks and organ meats such as liver, gizzards. Limit egg yolks to two to four per week and avoid organ meats like liver and gizzards to control cholesterol intake. Tips for Choosing Heart-Healthy Carbohydrates  Consume a consistent amount of carbohydrate  It is important to eat foods with carbohydrates in moderation because they impact your blood glucose level. Carbohydrates can be found in many foods such as:  Grains (breads, crackers, rice, pasta, and cereals)  Starchy Vegetables (potatoes, corn, and peas)  Beans and legumes  Milk, soy milk, and yogurt  Fruit and fruit juice  Sweets (cakes, cookies, ice cream, jam and jelly)  Your RDN will help you set a goal for how many carbohydrate servings to eat at your meals and snacks. For many adults, eating 3 to 5 servings of carbohydrate foods at each meal and 1 or 2 carbohydrate servings for each snack works well. Check your blood glucose level regularly.  It can tell you if you need to adjust when you eat carbohydrates. Choose foods rich in viscous (soluble) fiber  Viscous, or soluble, is found in the walls of plant cells. Viscous fiber is found only in plant-based foods. Eating foods with fiber helps to lower your unhealthy cholesterol and keep your blood glucose in range  Rich sources of viscous fiber include vegetables (asparagus, [place] sprouts, sweet potatoes, turnips) fruit (apricots, mangoes, oranges), legumes, and whole grains (barley, oats, and oat bran). As you increase your fiber intake gradually, also increase the amount of water you drink. This will help prevent constipation. If you have difficulty achieving this goal, ask your RDN about fiber laxatives. Choose fiber supplements made with viscous fibers such as psyllium seed husks or methylcellulose to help lower unhealthy cholesterol. Limit refined carbohydrates  There are three types of carbohydrates: starches, sugar, and fiber. Some carbohydrates occur naturally in food, like the starches in rice or corn or the sugars in fruits and milk. Refined carbohydrates--foods with high amounts of simple sugars--can raise triglyceride levels. High triglyceride levels are associated with coronary heart disease. Some examples of refined carbohydrate foods are table sugar, sweets, and beverages sweetened with added sugar. Tips for Reducing Sodium (Salt)  Although sodium is important for your body to function, too much sodium can be harmful for people with high blood pressure. As sodium and fluid buildup in your tissues and bloodstream, your blood pressure increases. High blood pressure may cause damage to other organs and increase your risk for a stroke. Even if you take a pill for blood pressure or a water pill (diuretic) to remove fluid, it is still important to have less salt in your diet. Ask your doctor and RDN what amount of sodium is right for you. Avoid processed foods. Eat more fresh foods.   Fresh fruits and vegetables are naturally low in sodium, as well as frozen vegetables and fruits that have no added juices or sauces. Fresh meats are lower in sodium than processed meats, such as araujo, sausage, and hotdogs. Read the nutrition label or ask your  to help you find a fresh meat that is low in sodium. Eat less salt--at the table and when cooking. A single teaspoon of table salt has 2,300 mg of sodium. Leave the salt out of recipes for pasta, casseroles, and soups. Ask your RDN how to cook your favorite recipes without sodium  Be a smart . Look for food packages that say salt-free or sodium-free.  These items contain less than 5 milligrams of sodium per serving. Very low-sodium products contain less than 35 milligrams of sodium per serving. Low-sodium products contain less than 140 milligrams of sodium per serving. Beware for Unsalted or No Added Salt products. These items may still be high in sodium. Check the nutrition label. Add flavors to your food without adding sodium. Try lemon juice, lime juice, fruit juice or vinegar. Dry or fresh herbs add flavor. Try basil, bay leaf, dill, rosemary, parsley, taylor, dry mustard, nutmeg, thyme, and paprika. Pepper, red pepper flakes, and cayenne pepper can add spice t your meals without adding sodium. Hot sauce contains sodium, but if you use just a drop or two, it will not add up to much. Buy a sodium-free seasoning blend or make your own at home. Additional Lifestyle Tips  Achieve and maintain a healthy weight. Talk with your RDN or your doctor about what is a healthy weight for you. Set goals to reach and maintain that weight. To lose weight, reduce your calorie intake along with increasing your physical activity. A weight loss of 10 to 15 pounds could reduce LDL-cholesterol by 5 milligrams per deciliter. Participate in physical activity.     Talk with your health care team to find out what types of physical activity are best for you. Set a plan to get about 30 minutes of exercise on most days.   Foods Recommended  Food Group    Foods Recommended    Grains    Whole grain breads and cereals, including whole wheat, barley, rye, buckwheat, corn, teff, quinoa, millet, amaranth, brown or wild rice, sorghum, and oats  Pasta, especially whole wheat or other whole grain types  [place], quinoa or wild rice  Whole grain crackers, bread, rolls, pitas  Home-made bread with reduced-sodium baking soda    Protein Foods    Lean cuts of beef and pork (loin, leg, round, extra lean hamburger)  Skinless poultry  Fish  Venison and other wild game  Dried beans and peas  Nuts and nut butters  Meat alternatives made with soy or textured vegetable protein  Egg whites or egg substitute  Cold cuts made with lean meat or soy protein    Dairy    Nonfat (skim), low-fat, or 1%-fat milk  Nonfat or low-fat yogurt or cottage cheese  Fat-free and low-fat cheese    Vegetables    Fresh, frozen, or canned vegetables without added fat or salt    Fruits    Fresh, frozen, canned, or dried fruit    Oils    Unsaturated oils (corn, olive, peanut, soy, sunflower, canola)  Soft or liquid margarines and vegetable oil spreads  Salad dressings  Seeds and nuts  Avocado    Foods Not Recommended  Food Group    Foods Not Recommended    Grains    Breads or crackers topped with salt  Cereals (hot or cold) with more than 300 mg sodium per serving  Biscuits, cornbread, and other quick breads prepared with baking soda  Bread crumbs or stuffing mix from a store  High-fat bakery products, such as doughnuts, biscuits, croissants, [place] pastries, pies, cookies  Instant cooking foods to which you add hot water and stir--potatoes, noodles, rice, etc.  Packaged starchy foods--seasoned noodle or rice dishes, stuffing mix, macaroni and cheese dinner  Snacks made with partially hydrogenated oils, including chips, cheese puffs, snack mixes, regular crackers, butter-flavored popcorn    Protein Foods    Higher-fat cuts of meats (ribs, t-bone steak, regular hamburger)  Costello, sausage, or hot dogs  Cold cuts, such as salami or bologna, deli meats, cured meats, corned beef  Organ meats (liver, brains, gizzards, sweetbreads)  Poultry with skin  Fried or smoked meat, poultry, and fish  Whole eggs and egg yolks (more than 2-4 per week)  Salted legumes, nuts, seeds, or nut/seed butters  Meat alternatives with high levels of sodium (>300 mg per serving) or saturated fat (>5 g per serving)    Dairy    Whole milk,?2% fat milk, buttermilk  Whole milk yogurt or ice cream  Cream  Half-&-half  Cream cheese  Sour cream  Cheese    Vegetables    Canned or frozen vegetables with salt, fresh vegetables prepared with salt, butter, cheese, or cream sauce  Fried vegetables  Pickled vegetables such as olives, pickles, or sauerkraut    Fruits    Fried fruits  Fruits served with butter or cream    Oils    Butter, stick margarine, shortening  Partially hydrogenated oils or trans fats  Tropical oils (coconut, palm, palm kernel oils)    Other    Candy, sugar sweetened soft drinks and desserts  Salt, sea salt, garlic salt, and seasoning mixes containing salt  Bouillon cubes  Ketchup, barbecue sauce, Worcestershire sauce, soy sauce, teriyaki sauce  Miso  Salsa  Pickles, olives, relish    Heart Healthy Consistent Carbohydrate Sample 1-Day Menu   Breakfast  1 cup cooked oatmeal (2 carbohydrate servings)  3/4 cup blueberries (1 carbohydrate serving)  1 ounce almonds  1 cup skim milk (1 carbohydrate serving)  1 cup coffee  Morning Snack  1 cup sugar-free nonfat yogurt (1 carbohydrate serving)  Lunch  2 slices whole-wheat bread (2 carbohydrate servings)  2 ounces lean turkey breast  1 ounce low-fat Swiss cheese  1 teaspoon mustard  1 slice tomato  1 lettuce leaf  1 small pear (1 carbohydrate serving)  1 cup skim milk (1 carbohydrate serving)  Afternoon Snack  1 ounce trail mix with unsalted nuts, seeds, and raisins (1 carbohydrate serving)  Evening Meal  3 ounces salmon  2/3 cup cooked brown rice (2 carbohydrate servings)  1 teaspoon soft margarine  1 cup cooked broccoli with 1/2 cup cooked carrots (1 carbohydrate serving  Carrots, cooked, boiled, drained, without salt  1 cup lettuce  1 teaspoon olive oil with vinegar for dressing  1 small whole grain roll (1 carbohydrate serving)  1 teaspoon soft margarine  1 cup unsweetened tea  Evening Snack  1 extra-small banana (1 carbohydrate serving)

## 2021-07-18 NOTE — PROGRESS NOTES
Hospitalist Progress Note      SYNOPSIS: Patient admitted on 2021 for CHF  47 y.o. male who presented to [address] [place] [address] with cardiomyopathy. Patient is transferred from [facility] after [facility] for [facility]F. Patient notes that over the last week he has had worsening shortness of breath. Associated with leg swelling.  + Orthopnea and PND. He was evaluated by Cardiology and diuresed. Echocardiogram reveals dilated LV with EF 10 to 15%. Patient is transferred to [address] [place] [address] for further evaluation and treatment. Patient denies family history of CHF or early CAD. He has remote history of methamphetamine, cocaine and Etoh use use but denies any further use in the last couple years. SUBJECTIVE:    Patient seen and examined  Denied any chest pain or worsening shortness of breath  Leg swelling significantly improved  No PND and orthopnea  No acute events overnight  Records reviewed. Stable overnight. No other overnight issues reported. Temp (24hrs), Av °F (36.1 °C), Min:96.2 °F (35.7 °C), Max:98 °F (36.7 °C)    DIET: ADULT DIET; Regular; 4 carb choices (60 gm/meal); Low Sodium (2 gm)  CODE: Full Code    Intake/Output Summary (Last 24 hours) at 2021 0953  Last data filed at 2021 0741  Gross per 24 hour   Intake 120 ml   Output 2450 ml   Net -2330 ml       OBJECTIVE:    /88   Pulse 85   Temp 96.9 °F (36.1 °C) (Temporal)   Resp 20   Ht 6' 1\" (1.854 m)   Wt 160 lb 3 oz (72.7 kg)   SpO2 97%   BMI 21.13 kg/m²     General appearance: No apparent distress, appears stated age and cooperative. HEENT:  Conjunctivae/corneas clear. Neck: Supple. No jugular venous distention. Respiratory: Clear to auscultation bilaterally, normal respiratory effort  Cardiovascular: Regular rate rhythm, normal S1-S2  Abdomen: Soft, nontender, nondistended  Musculoskeletal: No clubbing, cyanosis, no bilateral lower extremity edema. Brisk capillary refill.    Skin:  No rashes  on visible skin  Neurologic: awake, alert and following commands     ASSESSMENT AND PLAN:  1) Acute on chronic systolic CHF/dilated cardiomyopathy  -Due to Ischemic versus nonischemic cardiomyopathy  -TTE from the referring hospital showed EF of 10 to 15% with moderate mitral regurgitation  -Status post Lexiscan in [facility] hospital which showed extensive anteroseptal septal thinning with EF of 18%.   No ischemia  -Status post lifevest placement  -Symptoms improved with diuresis  -Cardiology on board, started on carvedilol 6.25 mg twice daily, Aldactone 25 daily and Entresto   -Plan is for [facility] later in the week per cardiology       2) DM type 2  -Continue basal, supplemental and prandial insulin coverage  -Farxiga to be started as OP due to additional benefit in patients with heart failure     3) Nicotine use disorder  -Counseled to quit          DISPOSITION: Home    Medications:  REVIEWED DAILY    Infusion Medications    sodium chloride      dextrose       Scheduled Medications    sacubitril-valsartan  1 tablet Oral BID    bumetanide  1 mg Intravenous BID    carvedilol  6.25 mg Oral BID WC    spironolactone  25 mg Oral Daily    insulin glargine  35 Units Subcutaneous QAM    sodium chloride flush  5-40 mL Intravenous 2 times per day    enoxaparin  40 mg Subcutaneous Daily    insulin lispro  0-12 Units Subcutaneous TID WC    insulin lispro  0-6 Units Subcutaneous Nightly     PRN Meds: sodium chloride flush, sodium chloride, ondansetron **OR** ondansetron, magnesium hydroxide, acetaminophen **OR** acetaminophen, glucose, dextrose, glucagon (rDNA), dextrose    Labs:     Recent Labs     07/16/21  0634   WBC 7.2   HGB 13.8   HCT 40.3*          Recent Labs     07/16/21  0634 07/17/21  0518 07/18/21  0649   * 134 138   K 4.9 4.3 4.6   CL 97* 96* 97*   CO2 28 27 33*   BUN 30* 30* 28*   CREATININE 1.2 1.1 1.2   CALCIUM 8.0* 8.0* 8.1*       Recent Labs     07/16/21  0634   PROT 5.8*   ALKPHOS 135*   ALT 25 AST 27   BILITOT 0.6       No results for input(s): INR in the last 72 hours. No results for input(s): Jodie Holly in the last 72 hours. Chronic labs:    Lab Results   Component Value Date    CHOL 110 07/17/2021    TRIG 74 07/17/2021    HDL 29 07/17/2021    LDLCALC 66 07/17/2021    TSH 1.290 07/17/2021    LABA1C 8.5 (H) 07/17/2021       Radiology: REVIEWED DAILY    +++++++++++++++++++++++++++++++++++++++++++++++++  Toya Chandra MD  [address]  +++++++++++++++++++++++++++++++++++++++++++++++++  NOTE: This report was transcribed using voice recognition software. Every effort was made to ensure accuracy; however, inadvertent computerized transcription errors may be present.

## 2021-07-18 NOTE — PROGRESS NOTES
DAILY PROGRESS NOTE - [facility]    SUBJECTIVE:    He is being followed for acute systolic heart failure. Admitted to [facility] last week with pulmonary edema requiring diuresis. Echo showed severe LV dilatation with EF 10 to 15% and moderate mitral regurgitation. Leandrew Heys showed extensive anteroseptal thinning and EF 18%. LifeVest placed at [facility] and he was transferred here for further care. 2 L negative and chest x-ray yesterday morning showed no further pulmonary edema. Hypertension, longstanding insulin requiring diabetes mellitus for over 20 years. Questionable hyperlipidemia. OBJECTIVE:    His vital signs were reviewed today. Vitals:    07/18/21 0730   BP: 131/88   Pulse: 85   Resp: 20   Temp: 96.9 °F (36.1 °C)   SpO2: 97%       Scheduled Meds:   bumetanide  1 mg Intravenous BID    carvedilol  6.25 mg Oral BID WC    spironolactone  25 mg Oral Daily    insulin glargine  35 Units Subcutaneous QAM    sodium chloride flush  5-40 mL Intravenous 2 times per day    enoxaparin  40 mg Subcutaneous Daily    insulin lispro  0-12 Units Subcutaneous TID WC    insulin lispro  0-6 Units Subcutaneous Nightly     Continuous Infusions:   sodium chloride      dextrose       PRN Meds:.sodium chloride flush, sodium chloride, ondansetron **OR** ondansetron, magnesium hydroxide, acetaminophen **OR** acetaminophen, glucose, dextrose, glucagon (rDNA), dextrose    REVIEW OF SYSTEMS:     No pruritus, rash, bruising. Cardiac symptoms per HPI. No nausea, vomiting, abdominal pain, GI bleeding, change in bowel habits. No dysuria, urinary frequency, urgency, hematuria, flank pain. Joint pain but no muscle soreness, stiffness, aching. No headache, speech disturbance, lateralizing neurologic deficit. No hemoptysis, epistaxis, easy bruising. No anxiety, depression. No symptoms of hypothyroidism, hyperthyroidism, diabetes, heat or cold intolerance.     FAMILY HISTORY: Negative for CAD in first-degree relatives. SOCIAL HISTORY: Negative for alcohol, tobacco, or illicit drug use. PHYSICAL EXAM:    General Appearance:  awake, alert, oriented, in no acute distress  Neck:  no bruits  Lungs:  Normal expansion. Coarse breath sounds at bases bilaterally and faint crackles to auscultation. No rales, rhonchi, or wheezing. Heart:  Heart sounds are normal.  Regular rate and rhythm without murmur. Soft apical heave palpated and S3 heard. No murmurs. Abdomen:  Soft, non-tender. Extremities: Extremities warm to touch, pink, with no edema. Neuro/musculosketal:  Unremarkable. LABS:    Recent Labs     07/16/21  0634 07/17/21  0518 07/18/21  0649   * 134 138   CREATININE 1.2 1.1 1.2       Recent Labs     07/16/21  0634   HGB 13.8       No results for input(s): INR in the last 72 hours. IMPRESSION:    #1.  Acute systolic heart failure-now euvolemic and have stopped the IV bumetanide and changed it to oral bumetanide 1 mg twice daily. #2.  Severe cardiomyopathy-continue carvedilol and add Entresto 24/26 mg twice daily to spironolactone which he already is on. Will likely need right and left heart cardiac catheterization once he is euvolemic. Plan to perform later on this week. #3. Hypertension-controlled. Medication changes as above. #4.  Diabetes-per hospitalist.    #5.  Continue to follow.

## 2021-07-19 LAB
ANION GAP SERPL CALCULATED.3IONS-SCNC: 8 MMOL/L (ref 7–16)
BUN BLDV-MCNC: 29 MG/DL (ref 6–20)
CALCIUM SERPL-MCNC: 7.9 MG/DL (ref 8.6–10.2)
CHLORIDE BLD-SCNC: 94 MMOL/L (ref 98–107)
CO2: 29 MMOL/L (ref 22–29)
CREAT SERPL-MCNC: 0.9 MG/DL (ref 0.7–1.2)
GFR AFRICAN AMERICAN: >60
GFR NON-AFRICAN AMERICAN: >60 ML/MIN/1.73
GLUCOSE BLD-MCNC: 396 MG/DL (ref 74–99)
MAGNESIUM: 2 MG/DL (ref 1.6–2.6)
METER GLUCOSE: 221 MG/DL (ref 74–99)
METER GLUCOSE: 252 MG/DL (ref 74–99)
METER GLUCOSE: 291 MG/DL (ref 74–99)
METER GLUCOSE: 409 MG/DL (ref 74–99)
METER GLUCOSE: 436 MG/DL (ref 74–99)
POTASSIUM SERPL-SCNC: 4.9 MMOL/L (ref 3.5–5)
SODIUM BLD-SCNC: 131 MMOL/L (ref 132–146)

## 2021-07-19 PROCEDURE — 6370000000 HC RX 637 (ALT 250 FOR IP): Performed by: INTERNAL MEDICINE

## 2021-07-19 PROCEDURE — 36415 COLL VENOUS BLD VENIPUNCTURE: CPT

## 2021-07-19 PROCEDURE — 2580000003 HC RX 258: Performed by: INTERNAL MEDICINE

## 2021-07-19 PROCEDURE — 6360000002 HC RX W HCPCS: Performed by: INTERNAL MEDICINE

## 2021-07-19 PROCEDURE — 2060000000 HC ICU INTERMEDIATE R&B

## 2021-07-19 PROCEDURE — 82962 GLUCOSE BLOOD TEST: CPT

## 2021-07-19 PROCEDURE — 80048 BASIC METABOLIC PNL TOTAL CA: CPT

## 2021-07-19 PROCEDURE — 83735 ASSAY OF MAGNESIUM: CPT

## 2021-07-19 PROCEDURE — 2500000003 HC RX 250 WO HCPCS: Performed by: INTERNAL MEDICINE

## 2021-07-19 RX ADMIN — CARVEDILOL 6.25 MG: 6.25 TABLET, FILM COATED ORAL at 17:52

## 2021-07-19 RX ADMIN — SACUBITRIL AND VALSARTAN 1 TABLET: 24; 26 TABLET, FILM COATED ORAL at 21:54

## 2021-07-19 RX ADMIN — INSULIN LISPRO 12 UNITS: 100 INJECTION, SOLUTION INTRAVENOUS; SUBCUTANEOUS at 06:43

## 2021-07-19 RX ADMIN — BUMETANIDE 1 MG: 0.25 INJECTION, SOLUTION INTRAMUSCULAR; INTRAVENOUS at 09:47

## 2021-07-19 RX ADMIN — INSULIN LISPRO 4 UNITS: 100 INJECTION, SOLUTION INTRAVENOUS; SUBCUTANEOUS at 12:22

## 2021-07-19 RX ADMIN — BUMETANIDE 1 MG: 0.25 INJECTION, SOLUTION INTRAMUSCULAR; INTRAVENOUS at 21:54

## 2021-07-19 RX ADMIN — Medication 10 ML: at 21:54

## 2021-07-19 RX ADMIN — INSULIN LISPRO 4 UNITS: 100 INJECTION, SOLUTION INTRAVENOUS; SUBCUTANEOUS at 17:52

## 2021-07-19 RX ADMIN — Medication 10 ML: at 09:59

## 2021-07-19 RX ADMIN — ENOXAPARIN SODIUM 40 MG: 100 INJECTION SUBCUTANEOUS at 09:50

## 2021-07-19 RX ADMIN — CARVEDILOL 6.25 MG: 6.25 TABLET, FILM COATED ORAL at 09:48

## 2021-07-19 RX ADMIN — SPIRONOLACTONE 25 MG: 25 TABLET ORAL at 09:49

## 2021-07-19 RX ADMIN — INSULIN GLARGINE 35 UNITS: 100 INJECTION, SOLUTION SUBCUTANEOUS at 09:50

## 2021-07-19 RX ADMIN — SACUBITRIL AND VALSARTAN 1 TABLET: 24; 26 TABLET, FILM COATED ORAL at 09:49

## 2021-07-19 NOTE — PROGRESS NOTES
Medications:  Current Facility-Administered Medications   Medication Dose Route Frequency Provider Last Rate Last Admin    sacubitril-valsartan (ENTRESTO) 24-26 MG per tablet 1 tablet  1 tablet Oral BID Farzaneh Mishra MD   1 tablet at 07/19/21 0949    insulin lispro (HUMALOG) injection vial 20 Units  20 Units Subcutaneous TID  Amanda Herrera MD   20 Units at 07/18/21 1621    bumetanide (BUMEX) injection 1 mg  1 mg Intravenous BID Farzaneh Mishra MD   1 mg at 07/19/21 0947    carvedilol (COREG) tablet 6.25 mg  6.25 mg Oral BID  Farzaneh Mishra MD   6.25 mg at 07/19/21 0948    spironolactone (ALDACTONE) tablet 25 mg  25 mg Oral Daily Farzaneh Mishra MD   25 mg at 07/19/21 0949    insulin glargine (LANTUS) injection vial 35 Units  35 Units Subcutaneous QAM Amanda Herrera MD   35 Units at 07/19/21 0950    sodium chloride flush 0.9 % injection 5-40 mL  5-40 mL Intravenous 2 times per day Triston Rodriguez MD   10 mL at 07/19/21 0959    sodium chloride flush 0.9 % injection 10 mL  10 mL Intravenous PRN Triston Rodriguez MD        0.9 % sodium chloride infusion  25 mL Intravenous PRN Triston Rodriguez MD        ondansetron (ZOFRAN-ODT) disintegrating tablet 4 mg  4 mg Oral Q8H PRN Triston Rodriguez MD        Or    ondansetron [facility]) injection 4 mg  4 mg Intravenous Q6H PRN Triston Rodriguez MD   4 mg at 07/17/21 2129    magnesium hydroxide (MILK OF MAGNESIA) 400 MG/5ML suspension 30 mL  30 mL Oral Daily PRN Triston Rodriguez MD        acetaminophen (TYLENOL) tablet 650 mg  650 mg Oral Q6H PRN Triston Rodriguez MD        Or    acetaminophen (TYLENOL) suppository 650 mg  650 mg Rectal Q6H PRN Triston Rodriguez MD        enoxaparin (LOVENOX) injection 40 mg  40 mg Subcutaneous Daily Triston Rodriguez MD   40 mg at 07/19/21 0950    glucose (GLUTOSE) 40 % oral gel 15 g  15 g Oral PRN Triston Rodriguez MD        dextrose 50 % IV solution  12.5 g Intravenous PRN Lillian Jara MD        glucagon (rDNA) injection 1 mg  1 mg Intramuscular PRN Lillian Jara MD        dextrose 5 % solution  100 mL/hr Intravenous PRN Lillian Jara MD        insulin lispro (HUMALOG) injection vial 0-12 Units  0-12 Units Subcutaneous TID WC Lillian Jara MD   12 Units at 07/19/21 6968    insulin lispro (HUMALOG) injection vial 0-6 Units  0-6 Units Subcutaneous Nightly Lillian Jara MD   4 Units at 07/17/21 2123      sodium chloride      dextrose         Physical Exam:  /89   Pulse 89   Temp 96.6 °F (35.9 °C) (Temporal)   Resp 20   Ht 6' 1\" (1.854 m)   Wt 164 lb 6 oz (74.6 kg)   SpO2 96%   BMI 21.69 kg/m²   Weight change: 4 lb 3 oz (1.899 kg)  Wt Readings from Last 3 Encounters:   07/19/21 164 lb 6 oz (74.6 kg)   11/28/17 175 lb (79.4 kg)     General: Awake, alert, oriented x3, no acute distress  Neck: No JVD, carotid bruits, thyromegaly, or lymphadenopathy  Cardiac: Regular rate and rhythm, normal S1 and split S2, no murmurs, no S3 or S4, no pericardial rubs. Carotid upstrokes brisk  Resp: clear bilaterally without wheezes, rhonchi, or rales; unlabored respirations  Abdomen: soft, nontender, nondistended, BS+; no masses, bruits, or hepatomegaly  Extremities: no cyanosis, clubbing, or edema. Distal pulses intact  Skin: Warm and dry, no rashes or lesions, + tatoos  Neuro: moves all extremities to command, no focal deficits noted    Intake/Output:    Intake/Output Summary (Last 24 hours) at 7/19/2021 1054  Last data filed at 7/19/2021 1027  Gross per 24 hour   Intake 370 ml   Output 1175 ml   Net -805 ml     I/O this shift:  In: 250 [P.O.:240; I.V.:10]  Out: -     Laboratory Tests:  Last 3 CBC:  No results for input(s): WBC, RBC, HGB, HCT, MCV, MCH, MCHC, RDW, PLT, MPV in the last 72 hours.     Last 3 CMP:    Recent Labs     07/17/21  0518 07/18/21  0649 07/19/21  0540    138 131*   K 4.3 4.6 4.9   CL 96* 97* 94*   CO2 27 33* 29   BUN 30* 28* 29* CREATININE 1.1 1.2 0.9   GLUCOSE 268* 193* 396*   CALCIUM 8.0* 8.1* 7.9*       Last 3 Mag/Phos:  Recent Labs     07/17/21  0518 07/18/21  0649 07/19/21  0540   MG 1.9 2.0 2.0       Last 3 CK, CKMB, Troponin  No results for input(s): CKTOTAL, CKMB, TROPONINI in the last 72 hours. Last 3 BNP:  No results for input(s): BNP in the last 72 hours. No results found for: BNP    Last 3 Glucose:     Recent Labs     07/17/21  0518 07/18/21  0649 07/19/21  0540   GLUCOSE 268* 193* 396*       Last 3 Coags:  No results for input(s): PROTIME, INR, PTT in the last 72 hours. No results found for: PROTIME, INR, PTT    Last 3 Lipid Panel:  Recent Labs     07/17/21 0518   LDLCALC 66   HDL 29   TRIG 74     Lab Results   Component Value Date    LDLCALC 66 07/17/2021     Lab Results   Component Value Date    HDL 29 07/17/2021    HDL 31 07/16/2021    HDL 32 07/15/2021     Lab Results   Component Value Date    TRIG 74 07/17/2021    TRIG 44 07/16/2021    TRIG 67 07/15/2021     No components found for: CHLPL    TSH:  Recent Labs     07/17/21 0518   TSH 1.290     Lab Results   Component Value Date    TSH 1.290 07/17/2021           Radiology:  XR CHEST (2 VW)   Final Result   Cardiac silhouette is at the upper limit of normal in size. No pulmonary   infiltrates or evidence of significant pulmonary edema. ASSESSMENT / PLAN:  #1.  Acute systolic heart failure-now euvolemic , changed tooral bumetanide 1 mg twice daily.     #2. Severe cardiomyopathy-continue carvedilol and add Entresto 24/26 mg twice daily to spironolactone which he already is on. Will likely need right and left heart cardiac catheterization once he is euvolemic- says he can lay flat. Plan to perform later on this week. SCAI indication 93,score7. Keep NPO after midnight     #3. Hypertension-controlled. Medication changes as above.     #4.   Diabetes-per hospitalist.    #5 URI- has been in droplet isolation since arrival originally came [employer] [employer] 7/14             Vini Mullins MD, MD, [address] [place], [place] [address]michelle Ortiz at [facility]    Electronically signed by Vini Mullins MD on 7/19/2021 at 10:54 AM

## 2021-07-19 NOTE — PROGRESS NOTES
Hospitalist Progress Note      SYNOPSIS: Patient admitted on 2021 for CHF  47 y.o. male who presented to [facility] with cardiomyopathy. Patient is transferred from [address] [place] [address] after admission for CHF. Patient notes that over the last week he has had worsening shortness of breath. Associated with leg swelling.  + Orthopnea and PND. He was evaluated by Cardiology and diuresed. Echocardiogram reveals dilated LV with EF 10 to 15%. Patient is transferred to [address] [place] [address] for further evaluation and treatment. He has remote history of methamphetamine, cocaine and Etoh use use but denies any further use in the last couple years. Cardiology on board; planning for [facility] later in the week. SUBJECTIVE:    Patient seen and examined  Denied any chest pain or worsening shortness of breath  Leg swelling significantly improved  No PND and orthopnea  No acute events overnight  Records reviewed. Stable overnight. No other overnight issues reported. Temp (24hrs), Av.9 °F (36.1 °C), Min:96.5 °F (35.8 °C), Max:97.3 °F (36.3 °C)    DIET: ADULT DIET; Regular; 4 carb choices (60 gm/meal); Low Sodium (2 gm)  CODE: Full Code    Intake/Output Summary (Last 24 hours) at 2021 0932  Last data filed at 2021 0616  Gross per 24 hour   Intake 360 ml   Output 1175 ml   Net -815 ml       OBJECTIVE:    /82   Pulse 80   Temp 96.5 °F (35.8 °C) (Temporal)   Resp 20   Ht 6' 1\" (1.854 m)   Wt 164 lb 6 oz (74.6 kg)   SpO2 98%   BMI 21.69 kg/m²     General appearance: No apparent distress, appears stated age and cooperative. HEENT:  Conjunctivae/corneas clear. Neck: Supple. No jugular venous distention. Respiratory: Clear to auscultation bilaterally, normal respiratory effort  Cardiovascular: Regular rate rhythm, normal S1-S2  Abdomen: Soft, nontender, nondistended  Musculoskeletal: No clubbing, cyanosis, no bilateral lower extremity edema. Brisk capillary refill.    Skin:  No rashes on visible skin  Neurologic: awake, alert and following commands     ASSESSMENT AND PLAN:  1) Acute on chronic systolic CHF/dilated cardiomyopathy  -Due to Ischemic versus nonischemic cardiomyopathy  -TTE from the referring hospital showed EF of 10 to 15% with moderate mitral regurgitation  -Status post Lexiscan in [facility] hospital which showed extensive anteroseptal septal thinning with EF of 18%. No ischemia  -Status post lifevest placement  -Symptoms improved with diuresis  -Cardiology on board, started on carvedilol 6.25 mg twice daily, Aldactone 25 daily and Entresto   -Plan is for [facility] later in the week per cardiology       2) DM type 2  -Continue basal, supplemental and prandial insulin coverage  -Farxiga to be started as OP due to additional benefit in patients with heart failure     3) Nicotine use disorder  -Counseled to quit          DISPOSITION: Home    Medications:  REVIEWED DAILY    Infusion Medications    sodium chloride      dextrose       Scheduled Medications    sacubitril-valsartan  1 tablet Oral BID    insulin lispro  20 Units Subcutaneous TID WC    bumetanide  1 mg Intravenous BID    carvedilol  6.25 mg Oral BID WC    spironolactone  25 mg Oral Daily    insulin glargine  35 Units Subcutaneous QAM    sodium chloride flush  5-40 mL Intravenous 2 times per day    enoxaparin  40 mg Subcutaneous Daily    insulin lispro  0-12 Units Subcutaneous TID WC    insulin lispro  0-6 Units Subcutaneous Nightly     PRN Meds: sodium chloride flush, sodium chloride, ondansetron **OR** ondansetron, magnesium hydroxide, acetaminophen **OR** acetaminophen, glucose, dextrose, glucagon (rDNA), dextrose    Labs:     No results for input(s): WBC, HGB, HCT, PLT in the last 72 hours.     Recent Labs     07/17/21  0518 07/18/21  0649 07/19/21  0540    138 131*   K 4.3 4.6 4.9   CL 96* 97* 94*   CO2 27 33* 29   BUN 30* 28* 29*   CREATININE 1.1 1.2 0.9   CALCIUM 8.0* 8.1* 7.9*       No results for input(s): PROT, ALB, ALKPHOS, ALT, AST, BILITOT, AMYLASE, LIPASE in the last 72 hours. No results for input(s): INR in the last 72 hours. No results for input(s): Courtney Lucinda in the last 72 hours. Chronic labs:    Lab Results   Component Value Date    CHOL 110 07/17/2021    TRIG 74 07/17/2021    HDL 29 07/17/2021    LDLCALC 66 07/17/2021    TSH 1.290 07/17/2021    LABA1C 8.5 (H) 07/17/2021       Radiology: REVIEWED DAILY    +++++++++++++++++++++++++++++++++++++++++++++++++  Burnette Schlatter, MD  [address]  +++++++++++++++++++++++++++++++++++++++++++++++++  NOTE: This report was transcribed using voice recognition software. Every effort was made to ensure accuracy; however, inadvertent computerized transcription errors may be present.

## 2021-07-19 NOTE — PLAN OF CARE
Problem: Falls - Risk of:  Goal: Will remain free from falls  Description: Will remain free from falls  7/18/2021 2206 by Isabela Locke RN  Outcome: Met This Shift  7/18/2021 1001 by Monika Purcell  Outcome: Met This Shift  Goal: Absence of physical injury  Description: Absence of physical injury  7/18/2021 2206 by Isabela Locke RN  Outcome: Met This Shift  7/18/2021 1001 by Monika Purcell  Outcome: Met This Shift     Problem: Cardiac:  Goal: Ability to maintain vital signs within normal range will improve  Description: Ability to maintain vital signs within normal range will improve  7/18/2021 2206 by Isabela Locke RN  Outcome: Met This Shift  7/18/2021 1001 by Monika Purcell  Outcome: Ongoing  Goal: Cardiovascular alteration will improve  Description: Cardiovascular alteration will improve  7/18/2021 2206 by Isabela Locke RN  Outcome: Met This Shift  7/18/2021 1001 by Monika Purcell  Outcome: Ongoing

## 2021-07-19 NOTE — PLAN OF CARE
Patient's chart updated to reflect:      . - HF care plan, HF education points and HF discharge instructions.  -Orders: 2 gram sodium diet, daily weights, I/O.  -PCP and cardiology follow up appointments to be scheduled within 7 days of hospital discharge. Will discuss cardiology outpatient follow up with patient during consultation. -CHF education session will be provided to the patient prior to hospital discharge.     Deisy Newsome RN, BSN  Heart Failure Navigator

## 2021-07-20 LAB
ABO/RH: NORMAL
ANION GAP SERPL CALCULATED.3IONS-SCNC: 9 MMOL/L (ref 7–16)
ANTIBODY SCREEN: NORMAL
BASOPHILS ABSOLUTE: 0.05 E9/L (ref 0–0.2)
BASOPHILS RELATIVE PERCENT: 0.5 % (ref 0–2)
BUN BLDV-MCNC: 28 MG/DL (ref 6–20)
CALCIUM SERPL-MCNC: 8.6 MG/DL (ref 8.6–10.2)
CHLORIDE BLD-SCNC: 93 MMOL/L (ref 98–107)
CO2: 30 MMOL/L (ref 22–29)
CREAT SERPL-MCNC: 1 MG/DL (ref 0.7–1.2)
EOSINOPHILS ABSOLUTE: 0.29 E9/L (ref 0.05–0.5)
EOSINOPHILS RELATIVE PERCENT: 3.1 % (ref 0–6)
GFR AFRICAN AMERICAN: >60
GFR NON-AFRICAN AMERICAN: >60 ML/MIN/1.73
GLUCOSE BLD-MCNC: 324 MG/DL (ref 74–99)
HCT VFR BLD CALC: 50.4 % (ref 37–54)
HEMOGLOBIN: 16.9 G/DL (ref 12.5–16.5)
IMMATURE GRANULOCYTES #: 0.02 E9/L
IMMATURE GRANULOCYTES %: 0.2 % (ref 0–5)
LYMPHOCYTES ABSOLUTE: 2.06 E9/L (ref 1.5–4)
LYMPHOCYTES RELATIVE PERCENT: 22.1 % (ref 20–42)
MAGNESIUM: 2.1 MG/DL (ref 1.6–2.6)
MCH RBC QN AUTO: 28.9 PG (ref 26–35)
MCHC RBC AUTO-ENTMCNC: 33.5 % (ref 32–34.5)
MCV RBC AUTO: 86.3 FL (ref 80–99.9)
METER GLUCOSE: 135 MG/DL (ref 74–99)
METER GLUCOSE: 149 MG/DL (ref 74–99)
METER GLUCOSE: 227 MG/DL (ref 74–99)
METER GLUCOSE: 337 MG/DL (ref 74–99)
METER GLUCOSE: 59 MG/DL (ref 74–99)
MONOCYTES ABSOLUTE: 0.51 E9/L (ref 0.1–0.95)
MONOCYTES RELATIVE PERCENT: 5.5 % (ref 2–12)
NEUTROPHILS ABSOLUTE: 6.41 E9/L (ref 1.8–7.3)
NEUTROPHILS RELATIVE PERCENT: 68.6 % (ref 43–80)
PDW BLD-RTO: 13.4 FL (ref 11.5–15)
PLATELET # BLD: 335 E9/L (ref 130–450)
PMV BLD AUTO: 9.7 FL (ref 7–12)
POTASSIUM SERPL-SCNC: 4.7 MMOL/L (ref 3.5–5)
PRO-BNP: 1567 PG/ML (ref 0–125)
RBC # BLD: 5.84 E12/L (ref 3.8–5.8)
SODIUM BLD-SCNC: 132 MMOL/L (ref 132–146)
WBC # BLD: 9.3 E9/L (ref 4.5–11.5)

## 2021-07-20 PROCEDURE — 83735 ASSAY OF MAGNESIUM: CPT

## 2021-07-20 PROCEDURE — B2111ZZ FLUOROSCOPY OF MULTIPLE CORONARY ARTERIES USING LOW OSMOLAR CONTRAST: ICD-10-PCS | Performed by: INTERNAL MEDICINE

## 2021-07-20 PROCEDURE — 86703 HIV-1/HIV-2 1 RESULT ANTBDY: CPT

## 2021-07-20 PROCEDURE — 86900 BLOOD TYPING SEROLOGIC ABO: CPT

## 2021-07-20 PROCEDURE — 6370000000 HC RX 637 (ALT 250 FOR IP): Performed by: INTERNAL MEDICINE

## 2021-07-20 PROCEDURE — 36415 COLL VENOUS BLD VENIPUNCTURE: CPT

## 2021-07-20 PROCEDURE — 93460 R&L HRT ART/VENTRICLE ANGIO: CPT

## 2021-07-20 PROCEDURE — 4A023N8 MEASUREMENT OF CARDIAC SAMPLING AND PRESSURE, BILATERAL, PERCUTANEOUS APPROACH: ICD-10-PCS | Performed by: INTERNAL MEDICINE

## 2021-07-20 PROCEDURE — C1751 CATH, INF, PER/CENT/MIDLINE: HCPCS

## 2021-07-20 PROCEDURE — 83880 ASSAY OF NATRIURETIC PEPTIDE: CPT

## 2021-07-20 PROCEDURE — 2060000000 HC ICU INTERMEDIATE R&B

## 2021-07-20 PROCEDURE — 86901 BLOOD TYPING SEROLOGIC RH(D): CPT

## 2021-07-20 PROCEDURE — C1887 CATHETER, GUIDING: HCPCS

## 2021-07-20 PROCEDURE — 80048 BASIC METABOLIC PNL TOTAL CA: CPT

## 2021-07-20 PROCEDURE — 2709999900 HC NON-CHARGEABLE SUPPLY

## 2021-07-20 PROCEDURE — 2580000003 HC RX 258: Performed by: INTERNAL MEDICINE

## 2021-07-20 PROCEDURE — B2131ZZ FLUOROSCOPY OF MULTIPLE CORONARY ARTERY BYPASS GRAFTS USING LOW OSMOLAR CONTRAST: ICD-10-PCS | Performed by: INTERNAL MEDICINE

## 2021-07-20 PROCEDURE — 82962 GLUCOSE BLOOD TEST: CPT

## 2021-07-20 PROCEDURE — C1769 GUIDE WIRE: HCPCS

## 2021-07-20 PROCEDURE — 93460 R&L HRT ART/VENTRICLE ANGIO: CPT | Performed by: INTERNAL MEDICINE

## 2021-07-20 PROCEDURE — 2500000003 HC RX 250 WO HCPCS

## 2021-07-20 PROCEDURE — 6360000002 HC RX W HCPCS

## 2021-07-20 PROCEDURE — 85025 COMPLETE CBC W/AUTO DIFF WBC: CPT

## 2021-07-20 PROCEDURE — C1894 INTRO/SHEATH, NON-LASER: HCPCS

## 2021-07-20 PROCEDURE — 86850 RBC ANTIBODY SCREEN: CPT

## 2021-07-20 PROCEDURE — B2151ZZ FLUOROSCOPY OF LEFT HEART USING LOW OSMOLAR CONTRAST: ICD-10-PCS | Performed by: INTERNAL MEDICINE

## 2021-07-20 RX ORDER — SODIUM CHLORIDE 9 MG/ML
25 INJECTION, SOLUTION INTRAVENOUS PRN
Status: DISCONTINUED | OUTPATIENT
Start: 2021-07-20 | End: 2021-07-22 | Stop reason: HOSPADM

## 2021-07-20 RX ORDER — SODIUM CHLORIDE 0.9 % (FLUSH) 0.9 %
5-40 SYRINGE (ML) INJECTION EVERY 12 HOURS SCHEDULED
Status: DISCONTINUED | OUTPATIENT
Start: 2021-07-20 | End: 2021-07-22 | Stop reason: HOSPADM

## 2021-07-20 RX ORDER — BUMETANIDE 1 MG/1
1 TABLET ORAL 2 TIMES DAILY
Status: DISCONTINUED | OUTPATIENT
Start: 2021-07-20 | End: 2021-07-22 | Stop reason: HOSPADM

## 2021-07-20 RX ORDER — ACETAMINOPHEN 325 MG/1
650 TABLET ORAL EVERY 4 HOURS PRN
Status: DISCONTINUED | OUTPATIENT
Start: 2021-07-20 | End: 2021-07-22 | Stop reason: HOSPADM

## 2021-07-20 RX ORDER — SODIUM CHLORIDE 0.9 % (FLUSH) 0.9 %
5-40 SYRINGE (ML) INJECTION PRN
Status: DISCONTINUED | OUTPATIENT
Start: 2021-07-20 | End: 2021-07-22 | Stop reason: HOSPADM

## 2021-07-20 RX ORDER — BUMETANIDE 1 MG/1
1 TABLET ORAL DAILY
Status: DISCONTINUED | OUTPATIENT
Start: 2021-07-20 | End: 2021-07-20

## 2021-07-20 RX ADMIN — ACETAMINOPHEN 650 MG: 325 TABLET ORAL at 22:11

## 2021-07-20 RX ADMIN — Medication 10 ML: at 07:58

## 2021-07-20 RX ADMIN — INSULIN LISPRO 20 UNITS: 100 INJECTION, SOLUTION INTRAVENOUS; SUBCUTANEOUS at 17:33

## 2021-07-20 RX ADMIN — SACUBITRIL AND VALSARTAN 1 TABLET: 24; 26 TABLET, FILM COATED ORAL at 22:11

## 2021-07-20 RX ADMIN — CARVEDILOL 6.25 MG: 6.25 TABLET, FILM COATED ORAL at 17:03

## 2021-07-20 RX ADMIN — Medication 10 ML: at 22:17

## 2021-07-20 RX ADMIN — CARVEDILOL 6.25 MG: 6.25 TABLET, FILM COATED ORAL at 07:58

## 2021-07-20 RX ADMIN — SACUBITRIL AND VALSARTAN 1 TABLET: 24; 26 TABLET, FILM COATED ORAL at 07:58

## 2021-07-20 RX ADMIN — INSULIN GLARGINE 35 UNITS: 100 INJECTION, SOLUTION SUBCUTANEOUS at 08:00

## 2021-07-20 RX ADMIN — Medication 10 ML: at 22:11

## 2021-07-20 RX ADMIN — INSULIN LISPRO 1 UNITS: 100 INJECTION, SOLUTION INTRAVENOUS; SUBCUTANEOUS at 22:10

## 2021-07-20 ASSESSMENT — PAIN SCALES - GENERAL
PAINLEVEL_OUTOF10: 6
PAINLEVEL_OUTOF10: 6
PAINLEVEL_OUTOF10: 0
PAINLEVEL_OUTOF10: 0

## 2021-07-20 NOTE — PLAN OF CARE
Problem: Falls - Risk of:  Goal: Will remain free from falls  Description: Will remain free from falls  Outcome: Met This Shift  Goal: Absence of physical injury  Description: Absence of physical injury  Outcome: Met This Shift     Problem: Cardiac:  Goal: Ability to maintain vital signs within normal range will improve  Description: Ability to maintain vital signs within normal range will improve  Outcome: Met This Shift  Goal: Cardiovascular alteration will improve  Description: Cardiovascular alteration will improve  Outcome: Ongoing

## 2021-07-20 NOTE — CARE COORDINATION
Chart reviewed, pt for right and left heart cath today; new Entresto voucher in soft-chart, bedside RN aware. Discharge plan remains unchanged home with spouse Fifi when medically stable.

## 2021-07-20 NOTE — PROGRESS NOTES
Hospitalist Progress Note      SYNOPSIS: Patient admitted on 2021 for CHF  47 y.o. male who presented to [facility] with cardiomyopathy. Patient is transferred from [facility] after admission for CHF. Patient notes that over the last week he has had worsening shortness of breath. Associated with leg swelling. + Orthopnea and PND. He was evaluated by Cardiology and diuresed. Echocardiogram reveals dilated LV with EF 10 to 15%. Patient is transferred to [address] [place] [address] for further evaluation and treatment. He has remote history of methamphetamine, cocaine and EtOH use use but denies any further use in the last couple years. Cardiology on board; planning for [facility] [address] [facility] later in the week. SUBJECTIVE:    Patient seen and examined at bedside in [facility] on RA. Pt denies any chest pain, palpitations or SOB. Appears euvolemic on exam. No PND or orthopnea. No acute events overnight    Records reviewed. Temp (24hrs), Av °F (36.1 °C), Min:96.9 °F (36.1 °C), Max:97.1 °F (36.2 °C)    DIET: Diet NPO  CODE: Full Code    Intake/Output Summary (Last 24 hours) at 2021 1220  Last data filed at 2021 0745  Gross per 24 hour   Intake 410 ml   Output 1650 ml   Net -1240 ml       OBJECTIVE:    /83   Pulse 93   Temp 96.9 °F (36.1 °C) (Temporal)   Resp 18   Ht 6' 1\" (1.854 m)   Wt 156 lb 1.6 oz (70.8 kg)   SpO2 97%   BMI 20.59 kg/m²     General appearance: No apparent distress, appears stated age and cooperative. HEENT:  Conjunctivae/corneas clear. Neck: Supple. No jugular venous distention. Respiratory: Clear to auscultation bilaterally, normal respiratory effort  Cardiovascular: Regular rate rhythm, normal S1-S2  Abdomen: Soft, nontender, nondistended  Musculoskeletal: No clubbing, cyanosis, no bilateral lower extremity edema. Brisk capillary refill.    Skin:  No rashes  on visible skin  Neurologic: awake, alert and following commands     ASSESSMENT AND PLAN:  1) Acute on chronic systolic CHF/dilated cardiomyopathy  - Ischemic vs nonischemic cardiomyopathy  - TTE from the referring hospital showed EF of 10 to 15% with moderate mitral regurgitation  - Status post Lexiscan in [facility] hospital which showed extensive anteroseptal septal thinning with EF of 18%. No ischemia.  - Status post lifevest placement  - Symptoms improved with IV diuresis. Transitioned to Bumex 1mg PO BID  - Cardiology on board, cont on carvedilol 6.25 mg BID, Aldactone 25 QD and Entresto 24/2 BID  - Plan is for [facility] today (07/20/21) per cardiology  - Obtain HIV     2) DM type 2  - Continue basal, supplemental and prandial insulin coverage  - Marlyse Glaze to be started as OP due to additional benefit in patients with heart failure     3) Nicotine use disorder  - Counseled to quit  - Nicotine replacement. DISPOSITION: Intermediate.     Medications:  REVIEWED DAILY    Infusion Medications    sodium chloride      dextrose       Scheduled Medications    sacubitril-valsartan  1 tablet Oral BID    insulin lispro  20 Units Subcutaneous TID WC    bumetanide  1 mg Intravenous BID    carvedilol  6.25 mg Oral BID WC    spironolactone  25 mg Oral Daily    insulin glargine  35 Units Subcutaneous QAM    sodium chloride flush  5-40 mL Intravenous 2 times per day    enoxaparin  40 mg Subcutaneous Daily    insulin lispro  0-12 Units Subcutaneous TID WC    insulin lispro  0-6 Units Subcutaneous Nightly     PRN Meds: sodium chloride flush, sodium chloride, ondansetron **OR** ondansetron, magnesium hydroxide, acetaminophen **OR** acetaminophen, glucose, dextrose, glucagon (rDNA), dextrose    Labs:     Recent Labs     07/20/21  0846   WBC 9.3   HGB 16.9*   HCT 50.4          Recent Labs     07/18/21  0649 07/19/21  0540 07/20/21  0847    131* 132   K 4.6 4.9 4.7   CL 97* 94* 93*   CO2 33* 29 30*   BUN 28* 29* 28*   CREATININE 1.2 0.9 1.0   CALCIUM 8.1* 7.9* 8.6       No results for input(s): PROT, ALB, ALKPHOS, ALT, AST, BILITOT, AMYLASE, LIPASE in the last 72 hours. No results for input(s): INR in the last 72 hours. No results for input(s): Kailee Lowers in the last 72 hours. Chronic labs:    Lab Results   Component Value Date    CHOL 110 07/17/2021    TRIG 74 07/17/2021    HDL 29 07/17/2021    LDLCALC 66 07/17/2021    TSH 1.290 07/17/2021    LABA1C 8.5 (H) 07/17/2021       Radiology: REVIEWED DAILY    +++++++++++++++++++++++++++++++++++++++++++++++++  Imsael Aquino MD  [facility] Physician - [address] [place] [address]  +++++++++++++++++++++++++++++++++++++++++++++++++  NOTE: This report was transcribed using voice recognition software. Every effort was made to ensure accuracy; however, inadvertent computerized transcription errors may be present.

## 2021-07-20 NOTE — PROGRESS NOTES
[facility] [address]    Name: Bethany Parker III    Age: 47 y.o. PCP: Shital Jordan MD    Date of Admission: 7/16/2021 10:44 PM    Date of Service: 7/20/2021    Chief Complaint: Follow-up for acute systolic HF  He is being followed for acute systolic heart failure. Admitted to [facility] last week with pulmonary edema requiring diuresis. Echo showed severe LV dilatation with EF 10 to 15% and moderate mitral regurgitation. Ramon Coreas showed extensive anteroseptal thinning and EF 18%. LifeVest placed at [facility] and he was transferred here for further care. 2 L negative and chest x-ray yesterday morning showed no further pulmonary edema.     Hypertension, longstanding insulin requiring diabetes mellitus for over 20 years. Questionable hyperlipidemia.     Interim History:  No new overnight cardiac complaints. Currently with no complaints of CP, SOB, palpitations, dizziness, or lightheadedness. Feels better . Diuresed 4.5 L net here. States able to lay flat. In droplet isolation for rhinovirus parainfluenza has been in hospital since 7/14. For cath sometime today. States rather anxious. Telemetry personally reviewed and showed sinus      Review of Systems:   Cardiac: As per HPI  General: No fever, chills  Pulmonary: No cough, wheeze, or shortness of breath  GI: No nausea, vomiting,or abdominal pain  Neuro: No headache or confusion    Problem List:  Active Problems:    Heart failure, systolic, with acute decompensation (HCC)    Type 1 diabetes mellitus ([address]    Hypertension    Cardiomyopathy ([address]    Parainfluenza virus infection    Rhinovirus infection    Tobacco abuse  Resolved Problems:    * No resolved hospital problems.  *      Past Medical History:  Past Medical History:   Diagnosis Date    Arthritis     hand, wrists    Bleeding of eye     right    CHF (congestive heart failure) ([address]     Diabetes ([address]     Hypertension     Neuropathy in diabetes [facility]     legs       Allergies:  No Known Allergies    Current Medications:  Current Facility-Administered Medications   Medication Dose Route Frequency Provider Last Rate Last Admin    sacubitril-valsartan (ENTRESTO) 24-26 MG per tablet 1 tablet  1 tablet Oral BID Madhuri Saldana MD   1 tablet at 07/20/21 0758    insulin lispro (HUMALOG) injection vial 20 Units  20 Units Subcutaneous TID  Jaimee Chapman MD   20 Units at 07/18/21 1621    bumetanide (BUMEX) injection 1 mg  1 mg Intravenous BID Madhuri Saldana MD   1 mg at 07/19/21 2154    carvedilol (COREG) tablet 6.25 mg  6.25 mg Oral BID  Madhuri Saldana MD   6.25 mg at 07/20/21 0758    spironolactone (ALDACTONE) tablet 25 mg  25 mg Oral Daily Madhuri Saldana MD   25 mg at 07/19/21 0949    insulin glargine (LANTUS) injection vial 35 Units  35 Units Subcutaneous QAM Isis GARCIA MD   35 Units at 07/20/21 0800    sodium chloride flush 0.9 % injection 5-40 mL  5-40 mL Intravenous 2 times per day Elis Stewart MD   10 mL at 07/20/21 0758    sodium chloride flush 0.9 % injection 10 mL  10 mL Intravenous PRN Elis Stewart MD        0.9 % sodium chloride infusion  25 mL Intravenous PRN Elis Stewart MD        ondansetron (ZOFRAN-ODT) disintegrating tablet 4 mg  4 mg Oral Q8H PRN Elis Stewart MD        Or    ondansetron [facility]) injection 4 mg  4 mg Intravenous Q6H PRN Elis Stewart MD   4 mg at 07/17/21 2129    magnesium hydroxide (MILK OF MAGNESIA) 400 MG/5ML suspension 30 mL  30 mL Oral Daily PRN Elis Stewart MD        acetaminophen (TYLENOL) tablet 650 mg  650 mg Oral Q6H PRN Elis Stewart MD        Or    acetaminophen (TYLENOL) suppository 650 mg  650 mg Rectal Q6H PRN Elis Stewart MD        enoxaparin (LOVENOX) injection 40 mg  40 mg Subcutaneous Daily Elis Stewart MD   40 mg at 07/19/21 0950    glucose (GLUTOSE) 40 % oral gel 15 g  15 g Oral PRN Elis Stewart MD  dextrose 50 % IV solution  12.5 g Intravenous PRN Jose Fry MD        glucagon (rDNA) injection 1 mg  1 mg Intramuscular PRN Jose Fry MD        dextrose 5 % solution  100 mL/hr Intravenous PRN Jose Fry MD        insulin lispro (HUMALOG) injection vial 0-12 Units  0-12 Units Subcutaneous TID WC Jose Fry MD   4 Units at 07/19/21 1752    insulin lispro (HUMALOG) injection vial 0-6 Units  0-6 Units Subcutaneous Nightly Jose Fry MD   4 Units at 07/17/21 2123      sodium chloride      dextrose         Physical Exam:  /83   Pulse 93   Temp 96.9 °F (36.1 °C) (Temporal)   Resp 18   Ht 6' 1\" (1.854 m)   Wt 156 lb 1.6 oz (70.8 kg)   SpO2 97%   BMI 20.59 kg/m²   Weight change: -8 lb 4.4 oz (-3.754 kg)  Wt Readings from Last 3 Encounters:   07/20/21 156 lb 1.6 oz (70.8 kg)   11/28/17 175 lb (79.4 kg)     General: Awake, alert, oriented x3, no acute distress  Neck: No JVD, carotid bruits, thyromegaly, or lymphadenopathy  Cardiac: Regular rate and rhythm, normal S1 and split S2, no murmurs, no S3 or S4, no pericardial rubs. Carotid upstrokes brisk  Resp: clear bilaterally without wheezes, rhonchi, or rales; unlabored respirations  Abdomen: soft, nontender, nondistended, BS+; no masses, bruits, or hepatomegaly  Extremities: no cyanosis, clubbing, or edema. Distal pulses intact  Skin: Warm and dry, no rashes or lesions, + tatoos  Neuro: moves all extremities to command, no focal deficits noted    Intake/Output:    Intake/Output Summary (Last 24 hours) at 7/20/2021 1141  Last data filed at 7/20/2021 0745  Gross per 24 hour   Intake 778 ml   Output 2450 ml   Net -1672 ml     I/O this shift:   In: 0   Out: 800 [Urine:800]    Laboratory Tests:  Last 3 CBC:  Recent Labs     07/20/21  0846   WBC 9.3   RBC 5.84*   HGB 16.9*   HCT 50.4   MCV 86.3   MCH 28.9   MCHC 33.5   RDW 13.4      MPV 9.7       Last 3 CMP:    Recent Labs     07/18/21  0649 07/19/21  0540 07/20/21  0847    131* 132   K 4.6 4.9 4.7   CL 97* 94* 93*   CO2 33* 29 30*   BUN 28* 29* 28*   CREATININE 1.2 0.9 1.0   GLUCOSE 193* 396* 324*   CALCIUM 8.1* 7.9* 8.6       Last 3 Mag/Phos:  Recent Labs     07/18/21  0649 07/19/21  0540 07/20/21  0847   MG 2.0 2.0 2.1       Last 3 CK, CKMB, Troponin  No results for input(s): CKTOTAL, CKMB, TROPONINI in the last 72 hours. Last 3 BNP:  No results for input(s): BNP in the last 72 hours. No results found for: BNP    Last 3 Glucose:     Recent Labs     07/18/21  0649 07/19/21  0540 07/20/21  0847   GLUCOSE 193* 396* 324*       Last 3 Coags:  No results for input(s): PROTIME, INR, PTT in the last 72 hours. No results found for: PROTIME, INR, PTT    Last 3 Lipid Panel:  No results for input(s): LDLCALC, HDL, TRIG in the last 72 hours. Invalid input(s): CHLPL  Lab Results   Component Value Date    LDLCALC 66 07/17/2021     Lab Results   Component Value Date    HDL 29 07/17/2021    HDL 31 07/16/2021    HDL 32 07/15/2021     Lab Results   Component Value Date    TRIG 74 07/17/2021    TRIG 44 07/16/2021    TRIG 67 07/15/2021     No components found for: CHLPL    TSH:  No results for input(s): TSH in the last 72 hours. Lab Results   Component Value Date    TSH 1.290 07/17/2021           Radiology:  XR CHEST (2 VW)   Final Result   Cardiac silhouette is at the upper limit of normal in size. No pulmonary   infiltrates or evidence of significant pulmonary edema. ASSESSMENT / PLAN:  #1.  Acute systolic heart failure-now euvolemic , changed to oral bumetanide 1 mg twice daily.     #2. Severe cardiomyopathy-continue carvedilol and add Entresto 24/26 mg twice daily to spironolactone which he already is on. Life vest in room. Will likely need right and left heart cardiac catheterization - says he can lay flat, so on schedule for today  . SCAI indication 93,score7.     #3. Hypertension-controlled. Medication changes as above.     #4. Diabetes-per hospitalist.    #5 URI- has been in droplet isolation since arrival originally came [facility] 7/14             Bambi Denney MD, MD, [address] [place] [address] at [facility]    Electronically signed by Bambi Denney MD on 7/20/2021 at 11:41 AM

## 2021-07-20 NOTE — PROGRESS NOTES
Right radial band removed at this time. No signs or bleeding or hematoma and vital signs remain stable. Pt complains of no pain at insertion site. Opsite dressing applied and pt education given on site care.

## 2021-07-20 NOTE — PROCEDURES
[address]                  [address] [facility],  [facility]                            CARDIAC CATHETERIZATION    PATIENT NAME: Mp Cadena                    :        1980  MED REC NO:   [id number]                            ROOM:       7402  ACCOUNT NO:   [de-identified]                           ADMIT DATE: 2021  PROVIDER:     Jackie Christensen MD    DATE OF PROCEDURE:  2021    LEFT AND RIGHT HEART CATHETERIZATION    INDICATION:  Acute CHF with cardiomyopathy and severe systolic  dysfunction. REFERRING PROVIDER:  Dr. Claudia Wahl from [facility]. PROCEDURE NOTE:  After obtaining a signed consent from the patient, he  was brought to the cardiac cath lab in his usual fasting state. Under  sterile condition and under local anesthetic and using conscious  sedation, a 7-Bahraini sheath was inserted into the right common femoral  vein using a 5-Bahraini micropuncture needle. Then, a 5-Bahraini  balloon-tip Tipton-Karina catheter was advanced under fluoroscopy guidance  to the RA, RV, PA and wedge position. The pressure tracing was obtained  from the different chambers. Oxygen saturation was obtained from the  RA, RV and PA. Then, thermodilution cardiac output was obtained. Then,  Tipton-Karina catheter was pulled out. Then under local anesthetic, a  6-Bahraini Terumo slender sheath was inserted into the right radial  artery. The patient received 5000 units of intravenous heparin. He  also received 200 mcg of intraarterial nitroglycerin via the right  radial sheath. No verapamil was given due to severe systolic  dysfunction. Then, a 5-Bahraini TIG diagnostic catheter was advanced to  the ascending aorta without difficulty. The left main coronary artery  was engaged and a coronary angiogram was done. This catheter failed to  engage the right coronary artery. It was engaging the conus branch.   It  was exchanged over a guidewire to a 5-Bahraini JR4 diagnostic catheter,  which was used to engage the right coronary artery and an angiogram was  done. This catheter was exchanged over a guidewire to a 5-Sinhala  pigtail, which was advanced into the left ventricle without difficulty. The left ventricular end-diastolic pressure was measured. No left  angiogram was done due to known ejection fraction. There was no  significant gradient across the aortic valve. At the end of the  procedure, the pigtail was pulled out. The Terumo slender sheath was  pulled out and a Vasc Band was applied over the right radial artery with  good hemostasis. Also, the right common femoral vein sheath was pulled  out and manual pressure was obtained with good hemostasis. The patient  tolerated the procedure very well and no complications were noted. No  significant blood loss occurred during the procedure. FINDINGS:  RIGHT HEART CATHETERIZATION:  PRESSURE:  1.  RA pressure 21/20 (20). 2.  RV pressure 55/16 (22). 3.  PA pressure 51/35 (42)  4. Wedge pressure 29 mmHg. CARDIAC OUTPUT:  2.48, 3.27, 3.26, and 2.69 liter per minute with an  average of 2.92 liters per minute. OXYGEN SATURATION:  1.  RA 56.4%. 2.  RV 54.8%. 3.  PA 54.9%. 4.  Radial artery sat 93.1%. LEFT HEMODYNAMICS:  Aortic pressure 116/90 mmHg. Left ventricular end-diastolic pressure 25 mmHg. CORONARY ANATOMY:  LEFT MAIN:  It is a long and large artery with no angiographic stenosis  noted. LAD:  It is a large artery wrapping around the apex and giving rise to  three diagonal branches and several septal perforators. The LAD was  heavily calcified in the mid segment with 70-80% stenosis between the  second and third diagonal branch and with CLIFFORD-3 flow distally. LEFT CIRCUMFLEX:  It is a large artery giving rise to a large  bifurcating obtuse marginal branch and probably an SA carol or AV carol  branch. No angiographic stenosis noted.   RCA:  It is a large dominant artery giving rise to a conus branch, an RV  marginal branch, a PDA and a bifurcating large PLV branch. There was  30% tubular mid RCA stenosis. There was 70% ostial PDA stenosis. IMPRESSION:  1. Increased right-sided filling pressure. 2.  Significantly elevated mean PA pressure at 42 mmHg. 3.  Elevated wedge pressure at 29 mmHg. 4.  Elevated LVEDP at 25 mmHg. 5.  Severe two-vessel CAD. RECOMMENDATIONS  1. Aggressive medical therapy. 2.  Guideline-directed medical therapy. 3.  CABG versus PCI. PROCEDURE START TIME:  15:05 p.m. PROCEDURE END TIME:  15:44 p.m. CONTRAST VOLUME:  35 mL of Isovue. FLUOROSCOPY TIME:  4.0 minutes    CONSCIOUS SEDATION:  1 mg of intravenous Versed and 50 mcg of  intravenous fentanyl.       Jesús Atwood MD    D: 07/20/2021 16:11:31       T: 07/20/2021 16:16:38     [email or web address]  Job#: [id number]     Doc#: [id number]    CC:

## 2021-07-21 LAB
ANION GAP SERPL CALCULATED.3IONS-SCNC: 9 MMOL/L (ref 7–16)
BUN BLDV-MCNC: 31 MG/DL (ref 6–20)
CALCIUM SERPL-MCNC: 8.6 MG/DL (ref 8.6–10.2)
CHLORIDE BLD-SCNC: 96 MMOL/L (ref 98–107)
CO2: 31 MMOL/L (ref 22–29)
CREAT SERPL-MCNC: 1.1 MG/DL (ref 0.7–1.2)
GFR AFRICAN AMERICAN: >60
GFR NON-AFRICAN AMERICAN: >60 ML/MIN/1.73
GLUCOSE BLD-MCNC: 200 MG/DL (ref 74–99)
HCT VFR BLD CALC: 48.2 % (ref 37–54)
HEMOGLOBIN: 15.7 G/DL (ref 12.5–16.5)
HIV-1 AND HIV-2 ANTIBODIES: NORMAL
MAGNESIUM: 2.3 MG/DL (ref 1.6–2.6)
MCH RBC QN AUTO: 28.4 PG (ref 26–35)
MCHC RBC AUTO-ENTMCNC: 32.6 % (ref 32–34.5)
MCV RBC AUTO: 87.2 FL (ref 80–99.9)
METER GLUCOSE: 105 MG/DL (ref 74–99)
METER GLUCOSE: 121 MG/DL (ref 74–99)
METER GLUCOSE: 130 MG/DL (ref 74–99)
METER GLUCOSE: 191 MG/DL (ref 74–99)
METER GLUCOSE: 360 MG/DL (ref 74–99)
PDW BLD-RTO: 13.4 FL (ref 11.5–15)
PLATELET # BLD: 306 E9/L (ref 130–450)
PMV BLD AUTO: 9.5 FL (ref 7–12)
POTASSIUM SERPL-SCNC: 4.6 MMOL/L (ref 3.5–5)
RBC # BLD: 5.53 E12/L (ref 3.8–5.8)
SODIUM BLD-SCNC: 136 MMOL/L (ref 132–146)
WBC # BLD: 7.9 E9/L (ref 4.5–11.5)

## 2021-07-21 PROCEDURE — 6370000000 HC RX 637 (ALT 250 FOR IP): Performed by: INTERNAL MEDICINE

## 2021-07-21 PROCEDURE — 6370000000 HC RX 637 (ALT 250 FOR IP): Performed by: STUDENT IN AN ORGANIZED HEALTH CARE EDUCATION/TRAINING PROGRAM

## 2021-07-21 PROCEDURE — 6360000002 HC RX W HCPCS: Performed by: INTERNAL MEDICINE

## 2021-07-21 PROCEDURE — 85027 COMPLETE CBC AUTOMATED: CPT

## 2021-07-21 PROCEDURE — 82962 GLUCOSE BLOOD TEST: CPT

## 2021-07-21 PROCEDURE — 2060000000 HC ICU INTERMEDIATE R&B

## 2021-07-21 PROCEDURE — 83735 ASSAY OF MAGNESIUM: CPT

## 2021-07-21 PROCEDURE — 36415 COLL VENOUS BLD VENIPUNCTURE: CPT

## 2021-07-21 PROCEDURE — 99254 IP/OBS CNSLTJ NEW/EST MOD 60: CPT | Performed by: THORACIC SURGERY (CARDIOTHORACIC VASCULAR SURGERY)

## 2021-07-21 PROCEDURE — 80048 BASIC METABOLIC PNL TOTAL CA: CPT

## 2021-07-21 PROCEDURE — 2580000003 HC RX 258: Performed by: INTERNAL MEDICINE

## 2021-07-21 RX ORDER — ASPIRIN 81 MG/1
81 TABLET, CHEWABLE ORAL DAILY
Status: DISCONTINUED | OUTPATIENT
Start: 2021-07-21 | End: 2021-07-22 | Stop reason: HOSPADM

## 2021-07-21 RX ORDER — ATORVASTATIN CALCIUM 80 MG/1
80 TABLET, FILM COATED ORAL NIGHTLY
Status: DISCONTINUED | OUTPATIENT
Start: 2021-07-21 | End: 2021-07-22 | Stop reason: HOSPADM

## 2021-07-21 RX ORDER — CARVEDILOL 6.25 MG/1
12.5 TABLET ORAL 2 TIMES DAILY WITH MEALS
Status: DISCONTINUED | OUTPATIENT
Start: 2021-07-21 | End: 2021-07-22 | Stop reason: HOSPADM

## 2021-07-21 RX ADMIN — ENOXAPARIN SODIUM 40 MG: 100 INJECTION SUBCUTANEOUS at 08:00

## 2021-07-21 RX ADMIN — SACUBITRIL AND VALSARTAN 1 TABLET: 24; 26 TABLET, FILM COATED ORAL at 08:00

## 2021-07-21 RX ADMIN — Medication 10 ML: at 22:14

## 2021-07-21 RX ADMIN — INSULIN LISPRO 20 UNITS: 100 INJECTION, SOLUTION INTRAVENOUS; SUBCUTANEOUS at 11:29

## 2021-07-21 RX ADMIN — Medication 10 ML: at 08:43

## 2021-07-21 RX ADMIN — INSULIN LISPRO 10 UNITS: 100 INJECTION, SOLUTION INTRAVENOUS; SUBCUTANEOUS at 11:26

## 2021-07-21 RX ADMIN — CARVEDILOL 12.5 MG: 6.25 TABLET, FILM COATED ORAL at 16:56

## 2021-07-21 RX ADMIN — INSULIN GLARGINE 35 UNITS: 100 INJECTION, SOLUTION SUBCUTANEOUS at 08:00

## 2021-07-21 RX ADMIN — ASPIRIN 81 MG: 81 TABLET, CHEWABLE ORAL at 08:40

## 2021-07-21 RX ADMIN — CARVEDILOL 6.25 MG: 6.25 TABLET, FILM COATED ORAL at 08:00

## 2021-07-21 RX ADMIN — INSULIN LISPRO 20 UNITS: 100 INJECTION, SOLUTION INTRAVENOUS; SUBCUTANEOUS at 16:56

## 2021-07-21 RX ADMIN — ATORVASTATIN CALCIUM 80 MG: 80 TABLET, FILM COATED ORAL at 22:14

## 2021-07-21 RX ADMIN — SACUBITRIL AND VALSARTAN 1 TABLET: 24; 26 TABLET, FILM COATED ORAL at 22:14

## 2021-07-21 RX ADMIN — BUMETANIDE 1 MG: 1 TABLET ORAL at 08:00

## 2021-07-21 RX ADMIN — SPIRONOLACTONE 25 MG: 25 TABLET ORAL at 08:00

## 2021-07-21 RX ADMIN — BUMETANIDE 1 MG: 1 TABLET ORAL at 18:02

## 2021-07-21 ASSESSMENT — PAIN SCALES - GENERAL
PAINLEVEL_OUTOF10: 0

## 2021-07-21 NOTE — PROGRESS NOTES
[facility] [address]    Name: Jhony Rojas III    Age: 47 y.o. PCP: Yeni Young MD    Date of Admission: 7/16/2021 10:44 PM    Date of Service: 7/21/2021    Chief Complaint: Follow-up for acute systolic HF  He is being followed for acute systolic heart failure. Admitted to [facility] last week with pulmonary edema requiring diuresis. Echo showed severe LV dilatation with EF 10 to 15% and moderate mitral regurgitation. Rosalea Gabby showed extensive anteroseptal thinning and EF 18%. LifeVest placed at [facility] and he was transferred here for further care. 2 L negative and chest x-ray yesterday morning showed no further pulmonary edema.     Hypertension, longstanding insulin requiring diabetes mellitus for over 20 years. Questionable hyperlipidemia.     Interim History:  No new overnight cardiac complaints. Currently with no complaints of CP, SOB, palpitations, dizziness, or lightheadedness. Feels better . In droplet isolation for rhinovirus parainfluenza has been in hospital since 7/14. cathed yesterday afternoon by Dr Richard Tejeda. Reviewed films and report -ostial PDA 80%, mid LAD between D2 and D3 about 80 %, overall appears to have big vessels and not a lot of other luminal narrowing. Apparently equivocal  CABG vs PCI, but CT has not been consulted yet. Pt got no other input yesterday. Telemetry personally reviewed and showed sinus      Review of Systems:   Cardiac: As per HPI  General: No fever, chills  Pulmonary: No cough, wheeze, or shortness of breath  GI: No nausea, vomiting,or abdominal pain  Neuro: No headache or confusion    Problem List:  Active Problems:    Heart failure, systolic, with acute decompensation (HCC)    Type 1 diabetes mellitus ([address]    Hypertension    Cardiomyopathy ([address] [address]    Parainfluenza virus infection    Rhinovirus infection    Tobacco abuse  Resolved Problems:    * No resolved hospital problems.  *      Past Medical History:  Past Medical History:   Diagnosis Date    Arthritis     hand, wrists    Bleeding of eye     right    [facility]est[facility]) [facility]     Diabetes ([facility]     Hypertension     Neuropathy in diabetes (HCC)     legs       Allergies:  No Known Allergies    Current Medications:  Current Facility-Administered Medications   Medication Dose Route Frequency Provider Last Rate Last Admin    aspirin chewable tablet 81 mg  81 mg Oral Daily Robert Shannon MD   81 mg at 07/21/21 0840    atorvastatin (LIPITOR) tablet 80 mg  80 mg Oral Nightly Robert Shannon MD        bumetanide (BUMEX) tablet 1 mg  1 mg Oral BID Robert Shannon MD   1 mg at 07/21/21 0800    sodium chloride flush 0.9 % injection 5-40 mL  5-40 mL Intravenous 2 times per day Bartolome Duke MD   10 mL at 07/20/21 2217    sodium chloride flush 0.9 % injection 5-40 mL  5-40 mL Intravenous PRN Bartolome Duke MD        0.9 % sodium chloride infusion  25 mL Intravenous PRN Bartolome Duke MD        acetaminophen (TYLENOL) tablet 650 mg  650 mg Oral Q4H PRN Bartolome Duke MD   650 mg at 07/20/21 2211    sacubitril-valsartan (ENTRESTO) 24-26 MG per tablet 1 tablet  1 tablet Oral BID Dany Gunn MD   1 tablet at 07/21/21 0800    insulin lispro (HUMALOG) injection vial 20 Units  20 Units Subcutaneous TID  Irene Ahumada MD   20 Units at 07/21/21 1129    carvedilol (COREG) tablet 6.25 mg  6.25 mg Oral BID  Dany Gunn MD   6.25 mg at 07/21/21 0800    spironolactone (ALDACTONE) tablet 25 mg  25 mg Oral Daily Dany Gunn MD   25 mg at 07/21/21 0800    insulin glargine (LANTUS) injection vial 35 Units  35 Units Subcutaneous QA Irene Ahumada MD   35 Units at 07/21/21 0800    sodium chloride flush 0.9 % injection 5-40 mL  5-40 mL Intravenous 2 times per day Jarvis Kim MD   10 mL at 07/21/21 0843    sodium chloride flush 0.9 % injection 10 mL  10 mL Intravenous PRN Jarvis Kim MD        0.9 % sodium chloride infusion  25 mL Intravenous PRN Marcel Zuñiga MD        ondansetron (ZOFRAN-ODT) disintegrating tablet 4 mg  4 mg Oral Q8H PRN Marcel Zuñiga MD        Or    ondansetron [facility]) injection 4 mg  4 mg Intravenous Q6H PRN Marcel Zuñiga MD   4 mg at 07/17/21 2129    magnesium hydroxide (MILK OF MAGNESIA) 400 MG/5ML suspension 30 mL  30 mL Oral Daily PRN Marcel Zuñiga MD        acetaminophen (TYLENOL) tablet 650 mg  650 mg Oral Q6H PRN Marcel Zuñiga MD        Or    acetaminophen (TYLENOL) suppository 650 mg  650 mg Rectal Q6H PRN Marcel Zuñiga MD        enoxaparin (LOVENOX) injection 40 mg  40 mg Subcutaneous Daily Marcel Zuñiga MD   40 mg at 07/21/21 0800    glucose (GLUTOSE) 40 % oral gel 15 g  15 g Oral PRN Marcel Zuñiga MD        dextrose 50 % IV solution  12.5 g Intravenous PRN Marcel Zuñiga MD        glucagon (rDNA) injection 1 mg  1 mg Intramuscular PRN Marcel Zuñiga MD        dextrose 5 % solution  100 mL/hr Intravenous PRN Marcel Zuñiga MD        insulin lispro (HUMALOG) injection vial 0-12 Units  0-12 Units Subcutaneous TID WC Marcel Zuñiga MD   10 Units at 07/21/21 1126    insulin lispro (HUMALOG) injection vial 0-6 Units  0-6 Units Subcutaneous Nightly Marcel Zuñiga MD   1 Units at 07/20/21 2210      sodium chloride      sodium chloride      dextrose         Physical Exam:  /69   Pulse 83   Temp 96.9 °F (36.1 °C) (Temporal)   Resp 16   Ht 6' 1\" (1.854 m)   Wt 163 lb (73.9 kg)   SpO2 97%   BMI 21.51 kg/m²   Weight change: 6 lb 14.4 oz (3.13 kg)  Wt Readings from Last 3 Encounters:   07/21/21 163 lb (73.9 kg)   11/28/17 175 lb (79.4 kg)     General: Awake, alert, oriented x3, no acute distress  Neck: No JVD, carotid bruits, thyromegaly, or lymphadenopathy  Cardiac: Regular rate and rhythm, normal S1 and split S2, no murmurs, no S3 or S4, no pericardial rubs.   Carotid upstrokes brisk  Resp: clear bilaterally without wheezes, rhonchi, or rales; unlabored respirations  Abdomen: soft, nontender, nondistended, BS+; no masses, bruits, or hepatomegaly  Extremities: no cyanosis, clubbing, or edema. Distal pulses intact, right wrist looks ok cath site  Skin: Warm and dry, no rashes or lesions, + tatoos  Neuro: moves all extremities to command, no focal deficits noted    Intake/Output:    Intake/Output Summary (Last 24 hours) at 7/21/2021 1214  Last data filed at 7/20/2021 1406  Gross per 24 hour   Intake 0 ml   Output --   Net 0 ml     No intake/output data recorded. Laboratory Tests:  Last 3 CBC:  Recent Labs     07/20/21  0846 07/21/21  0806   WBC 9.3 7.9   RBC 5.84* 5.53   HGB 16.9* 15.7   HCT 50.4 48.2   MCV 86.3 87.2   MCH 28.9 28.4   MCHC 33.5 32.6   RDW 13.4 13.4    306   MPV 9.7 9.5       Last 3 CMP:    Recent Labs     07/19/21  0540 07/20/21  0847 07/21/21  0806   * 132 136   K 4.9 4.7 4.6   CL 94* 93* 96*   CO2 29 30* 31*   BUN 29* 28* 31*   CREATININE 0.9 1.0 1.1   GLUCOSE 396* 324* 200*   CALCIUM 7.9* 8.6 8.6       Last 3 Mag/Phos:  Recent Labs     07/19/21  0540 07/20/21  0847 07/21/21  0806   MG 2.0 2.1 2.3       Last 3 CK, CKMB, Troponin  No results for input(s): CKTOTAL, CKMB, TROPONINI in the last 72 hours. Last 3 BNP:  No results for input(s): BNP in the last 72 hours. No results found for: BNP    Last 3 Glucose:     Recent Labs     07/19/21  0540 07/20/21  0847 07/21/21  0806   GLUCOSE 396* 324* 200*       Last 3 Coags:  No results for input(s): PROTIME, INR, PTT in the last 72 hours. No results found for: PROTIME, INR, PTT    Last 3 Lipid Panel:  No results for input(s): LDLCALC, HDL, TRIG in the last 72 hours.     Invalid input(s): CHLPL  Lab Results   Component Value Date    LDLCALC 66 07/17/2021     Lab Results   Component Value Date    HDL 29 07/17/2021    HDL 31 07/16/2021    HDL 32 07/15/2021     Lab Results   Component Value Date    TRIG 74 07/17/2021    TRIG 44 07/16/2021    TRIG

## 2021-07-21 NOTE — CARE COORDINATION
Received call from Sania([facility] care manager) [phone], updated her on pt's medical status. Pt has had left and right heart cath, severe vessel disease. Possible CABG vs PCI-stent. Plan is home with spouse. Kristyn Fischer, MSW, LSW

## 2021-07-21 NOTE — PROGRESS NOTES
CTS consult placed via [facility] serve to Dr Ezra Lara.   Sandee MIKE taking messages

## 2021-07-21 NOTE — PROGRESS NOTES
Cardiology:  CT surgery saw patient and felt CABG would not benefit pt. Would then try for PCI  Of the LAD+/- PDA and will try to continue to titrate meds as tolerated. Would keep NPO after midnight.

## 2021-07-21 NOTE — PROGRESS NOTES
Hospitalist Progress Note      SYNOPSIS: Patient admitted on 2021 for CHF  47 y.o. male who presented to [facility] with cardiomyopathy. Patient is transferred from THE [facility] after admission for CHF. Patient notes that over the last week he has had worsening shortness of breath. Associated with leg swelling. + Orthopnea and PND. He was evaluated by Cardiology and diuresed. Echocardiogram reveals dilated LV with EF 10 to 15%. Patient is transferred to [address] [place] [address] for further evaluation and treatment. He has remote history of methamphetamine, cocaine and EtOH use use but denies any further use in the last couple years. Cardiology on board; planning for [facility] later in the week. SUBJECTIVE:    Patient seen and examined at bedside in [facility] on RA. Pt denies any chest pain, palpitations or SOB. Appears euvolemic on exam. No PND or orthopnea. Pt s/p cardiac cath on  with severe 2-vessel disease. No acute events overnight    Records reviewed. Temp (24hrs), Av.2 °F (36.2 °C), Min:96.6 °F (35.9 °C), Max:97.9 °F (36.6 °C)    DIET: ADULT DIET; Regular; Low Fat/Low Chol/High Fiber/2 gm Na  CODE: Full Code    Intake/Output Summary (Last 24 hours) at 2021 1214  Last data filed at 2021 1406  Gross per 24 hour   Intake 0 ml   Output --   Net 0 ml       OBJECTIVE:    /69   Pulse 83   Temp 96.9 °F (36.1 °C) (Temporal)   Resp 16   Ht 6' 1\" (1.854 m)   Wt 163 lb (73.9 kg)   SpO2 97%   BMI 21.51 kg/m²     General appearance: No apparent distress, appears stated age and cooperative. HEENT:  Conjunctivae/corneas clear. Neck: Supple. No jugular venous distention. Respiratory: Clear to auscultation bilaterally, normal respiratory effort  Cardiovascular: Regular rate rhythm, normal S1-S2  Abdomen: Soft, nontender, nondistended  Musculoskeletal: No clubbing, cyanosis, no bilateral lower extremity edema. Brisk capillary refill.    Skin:  No rashes  on visible skin  Neurologic: awake, alert and following commands     ASSESSMENT AND PLAN:  1) Acute on chronic systolic CHF/dilated cardiomyopathy  - Ischemic cardiomyopathy with severe 2-vessel disease on Cardiac Cath on 07/20/21  - TTE from the referring hospital showed EF of 10 to 15% with moderate mitral regurgitation  - Status post Lexiscan in [facility] hospital which showed extensive anteroseptal septal thinning with EF of 18%. No ischemia.  - Status post lifevest placement  - Symptoms improved with IV diuresis. Cont on Bumex 1mg PO BID  - Cardiology on board, cont on ASA, Statin, carvedilol 6.25 mg BID, Aldactone 25 QD and Entresto 24/2 BID  - F/U HIV  - CTS to eval for CABG vs PCI    2) DM type 2  - Continue basal, supplemental and prandial insulin coverage  - Farxiga to be started as OP due to additional benefit in patients with heart failure     3) Nicotine use disorder  - Counseled to quit  - Nicotine replacement. DISPOSITION: Intermediate.     Medications:  REVIEWED DAILY    Infusion Medications    sodium chloride      sodium chloride      dextrose       Scheduled Medications    aspirin  81 mg Oral Daily    atorvastatin  80 mg Oral Nightly    bumetanide  1 mg Oral BID    sodium chloride flush  5-40 mL Intravenous 2 times per day    sacubitril-valsartan  1 tablet Oral BID    insulin lispro  20 Units Subcutaneous TID WC    carvedilol  6.25 mg Oral BID WC    spironolactone  25 mg Oral Daily    insulin glargine  35 Units Subcutaneous QAM    sodium chloride flush  5-40 mL Intravenous 2 times per day    enoxaparin  40 mg Subcutaneous Daily    insulin lispro  0-12 Units Subcutaneous TID WC    insulin lispro  0-6 Units Subcutaneous Nightly     PRN Meds: sodium chloride flush, sodium chloride, acetaminophen, sodium chloride flush, sodium chloride, ondansetron **OR** ondansetron, magnesium hydroxide, acetaminophen **OR** acetaminophen, glucose, dextrose, glucagon (rDNA), dextrose    Labs:     Recent Labs 07/20/21  0846 07/21/21  0806   WBC 9.3 7.9   HGB 16.9* 15.7   HCT 50.4 48.2    306       Recent Labs     07/19/21  0540 07/20/21  0847 07/21/21  0806   * 132 136   K 4.9 4.7 4.6   CL 94* 93* 96*   CO2 29 30* 31*   BUN 29* 28* 31*   CREATININE 0.9 1.0 1.1   CALCIUM 7.9* 8.6 8.6       No results for input(s): PROT, ALB, ALKPHOS, ALT, AST, BILITOT, AMYLASE, LIPASE in the last 72 hours. No results for input(s): INR in the last 72 hours. No results for input(s): Courtney Lucinda in the last 72 hours. Chronic labs:    Lab Results   Component Value Date    CHOL 110 07/17/2021    TRIG 74 07/17/2021    HDL 29 07/17/2021    LDLCALC 66 07/17/2021    TSH 1.290 07/17/2021    LABA1C 8.5 (H) 07/17/2021       Radiology: REVIEWED DAILY    +++++++++++++++++++++++++++++++++++++++++++++++++  Radha Chau MD  [facility] - [address] [place] [address]  +++++++++++++++++++++++++++++++++++++++++++++++++  NOTE: This report was transcribed using voice recognition software. Every effort was made to ensure accuracy; however, inadvertent computerized transcription errors may be present.

## 2021-07-21 NOTE — CONSULTS
CTS Consult    Patient name: Kelly Adrian III    Reason for consult:  CAD    Primary Care Physician: Damion Moran MD    Date of service: 7/21/2021    Chief Complaint:  SOB    HPI:  48 yo male with hx of DM who was transferred from THE [facility] after admission for CHF. [facility] states that over the last week he has had worsening shortness of breath. Associated with leg swelling. + Orthopnea and PND.  Echocardiogram reveals dilated LV with EF 10 to 15%.      Allergies: No Known Allergies    Home medications:    Current Facility-Administered Medications   Medication Dose Route Frequency Provider Last Rate Last Admin    aspirin chewable tablet 81 mg  81 mg Oral Daily Odalys Amaral MD   81 mg at 07/21/21 0840    atorvastatin (LIPITOR) tablet 80 mg  80 mg Oral Nightly Odalys Amaral MD        carvedilol (COREG) tablet 12.5 mg  12.5 mg Oral BID  Ozzy Trinidad MD        perflutren lipid microspheres (DEFINITY) injection 1.65 mg  1.5 mL Intravenous ONCE PRN CEE Amor        bumetanide (BUMEX) tablet 1 mg  1 mg Oral BID Odalys Amaral MD   1 mg at 07/21/21 0800    sodium chloride flush 0.9 % injection 5-40 mL  5-40 mL Intravenous 2 times per day Letty Ledesma MD   10 mL at 07/20/21 2217    sodium chloride flush 0.9 % injection 5-40 mL  5-40 mL Intravenous PRN Letty Ledesma MD        0.9 % sodium chloride infusion  25 mL Intravenous PRN Letty Ledesma MD        acetaminophen (TYLENOL) tablet 650 mg  650 mg Oral Q4H PRN Letty Ledesma MD   650 mg at 07/20/21 2211    sacubitril-valsartan (ENTRESTO) 24-26 MG per tablet 1 tablet  1 tablet Oral BID Olive Wilson MD   1 tablet at 07/21/21 0800    insulin lispro (HUMALOG) injection vial 20 Units  20 Units Subcutaneous TID  Darcy Huerta MD   20 Units at 07/21/21 1129    spironolactone (ALDACTONE) tablet 25 mg  25 mg Oral Daily Olive Wilson MD   25 mg at 07/21/21 0800    insulin glargine (LANTUS) injection vial 35 Units  35 Units Subcutaneous QAM Ezra Gee MD   35 Units at 07/21/21 0800    sodium chloride flush 0.9 % injection 5-40 mL  5-40 mL Intravenous 2 times per day Chelsie Roberts MD   10 mL at 07/21/21 0843    sodium chloride flush 0.9 % injection 10 mL  10 mL Intravenous PRN Chelsie Roberts MD        0.9 % sodium chloride infusion  25 mL Intravenous PRN Chelsie Roberts MD        ondansetron (ZOFRAN-ODT) disintegrating tablet 4 mg  4 mg Oral Q8H PRN Chelsie Roberts MD        Or    ondansetron [facility] injection 4 mg  4 mg Intravenous Q6H PRN Chelsie Roberts MD   4 mg at 07/17/21 2129    magnesium hydroxide (MILK OF MAGNESIA) 400 MG/5ML suspension 30 mL  30 mL Oral Daily PRN Chelsie Roberts MD        acetaminophen (TYLENOL) tablet 650 mg  650 mg Oral Q6H PRN Chelsie Roberts MD        Or    acetaminophen (TYLENOL) suppository 650 mg  650 mg Rectal Q6H PRN Chelsie Roberts MD        enoxaparin (LOVENOX) injection 40 mg  40 mg Subcutaneous Daily Chelsie Roberts MD   40 mg at 07/21/21 0800    glucose (GLUTOSE) 40 % oral gel 15 g  15 g Oral PRN Chelsie Roberts MD        dextrose 50 % IV solution  12.5 g Intravenous PRN Chelsie Roberts MD        glucagon (rDNA) injection 1 mg  1 mg Intramuscular PRN Chelsie Roberts MD        dextrose 5 % solution  100 mL/hr Intravenous PRN Chelsie Roberts MD        insulin lispro (HUMALOG) injection vial 0-12 Units  0-12 Units Subcutaneous TID WC Chelsie Roberts MD   10 Units at 07/21/21 1126    insulin lispro (HUMALOG) injection vial 0-6 Units  0-6 Units Subcutaneous Nightly Chelsie Roberts MD   1 Units at 07/20/21 2210       Past Medical History:  Past Medical History:   Diagnosis Date    Arthritis     hand, wrists    Bleeding of eye     right    [facility]     Diabetes [facility]     Hypertension     Neuropathy in diabetes ([facility]     legs       Past Surgical History:  No past surgical history on file.     Social History:  Social History     Socioeconomic History    Marital status:      Spouse name: Not on file    Number of children: Not on file    Years of education: Not on file    Highest education level: Not on file   Occupational History    Not on file   Tobacco Use    Smoking status: Current Every Day Smoker     Packs/day: 0.50     Years: 20.00     Pack years: 10.00     Types: Cigarettes     Start date: 11/28/1997    Smokeless tobacco: Never Used   Vaping Use    Vaping Use: Some days    Substances: Nicotine, Flavoring   Substance and Sexual Activity    Alcohol use: No    Drug use: Never    Sexual activity: Not on file   Other Topics Concern    Not on file   Social History Narrative    Not on file     Social Determinants of Health     Financial Resource Strain:     Difficulty of Paying Living Expenses:    Food Insecurity:     Worried About Running Out of Food in the Last Year:     Ran Out of Food in the Last Year:    Transportation Needs:     Lack of Transportation (Medical):  Lack of Transportation (Non-Medical):    Physical Activity:     Days of Exercise per Week:     Minutes of Exercise per Session:    Stress:     Feeling of Stress :    Social Connections:     Frequency of Communication with Friends and Family:     Frequency of Social Gatherings with Friends and Family:     Attends Yazdanism Services:     Active Member of Clubs or Organizations:     Attends Club or Organization Meetings:     Marital Status:    Intimate Partner Violence:     Fear of Current or Ex-Partner:     Emotionally Abused:     Physically Abused:     Sexually Abused:        Family History:  Family History   Problem Relation Age of Onset    Other Mother     Other Father     No Known Problems Sister     No Known Problems Brother        Review of Systems:  Constitutional: Denies fevers, chills, or weight loss. HEENT: Denies visual changes or hearing loss. Heart: As per HPI.    Lungs: Denies shortness of breath, cough, or wheezing. Gastrointestinal: Denies nausea, vomiting, constipation, or diarrhea. Genitourinary: dysuria or hematuria. Psychiatric: Patient denies anxiety or depression. Neurologic: Patient denies weakness of the extremities, dizziness, or headaches. All other ROS checked and found to be negative. Labs:  Recent Labs     07/20/21  0846 07/21/21  0806   WBC 9.3 7.9   HGB 16.9* 15.7   HCT 50.4 48.2    306      Recent Labs     07/19/21  0540 07/20/21  0847 07/21/21  0806   BUN 29* 28* 31*   CREATININE 0.9 1.0 1.1       Objective:  Vitals /69   Pulse 83   Temp 96.9 °F (36.1 °C) (Temporal)   Resp 16   Ht 6' 1\" (1.854 m)   Wt 163 lb (73.9 kg)   SpO2 97%   BMI 21.51 kg/m²   General Appearance: Pleasant 47y.o. year old male who appears stated age. Communicates well, no acute distress. HEENT: Head is normocephalic, atraumatic. EOMs intact, PERRL. Trachea midline. Lungs: Normal respiratory rate and normal effort. He is not in respiratory distress. Breath sounds clear to auscultation. No wheezes. Heart: Normal rate. Regular rhythm. S1 normal and S2 normal.   Chest: Symmetric chest wall expansion. Extremities: Normal range of motion. Neurological: Patient is alert and oriented to person, place and time. Patient has normal reflexes. Skin: Warm and dry. Abdomen: Abdomen is soft and non-distended. Bowel sounds are normal. There is no abdominal tenderness tenderness. There is no guarding. There is no mass. Pulses: Distal pulses are intact. Skin: Warm and dry without lesions. CORONARY ANATOMY:  LEFT MAIN:  It is a long and large artery with no angiographic stenosis  noted. LAD:  It is a large artery wrapping around the apex and giving rise to  three diagonal branches and several septal perforators. The LAD was  heavily calcified in the mid segment with 70-80% stenosis between the  second and third diagonal branch and with CLIFFORD-3 flow distally.   LEFT CIRCUMFLEX:  It is a large artery giving rise to a large  bifurcating obtuse marginal branch and probably an SA carol or AV carol  branch. No angiographic stenosis noted. RCA:  It is a large dominant artery giving rise to a conus branch, an RV  marginal branch, a PDA and a bifurcating large PLV branch. There was  30% tubular mid RCA stenosis. There was 70% ostial PDA stenosis.     IMPRESSION:  1. Increased right-sided filling pressure. 2.  Significantly elevated mean PA pressure at 42 mmHg. 3.  Elevated wedge pressure at 29 mmHg. 4.  Elevated LVEDP at 25 mmHg. 5.  Severe two-vessel CAD.     RECOMMENDATIONS  1. Aggressive medical therapy. 2.  Guideline-directed medical therapy. 3.  CABG versus PCI.     Assessment/Plan  48 yo male admitted with CHF symptoms noted to have likely idiopathic cardiomyopathy with EF of 10%   [facility] reve[facility] lesions in the LAD and PDA which does not correlate to his degree of cardiomyopathy   Would be hesitant to do bypass at this point as his cardiomyopathy may very well not be improved and the risk would far outweigh the reward   Could possibly consider PCI if feasible + medical therapy      Electronically signed by Kat Moore DO on 7/21/2021 at 1:55 PM

## 2021-07-22 VITALS
BODY MASS INDEX: 21.06 KG/M2 | TEMPERATURE: 96.9 F | SYSTOLIC BLOOD PRESSURE: 108 MMHG | WEIGHT: 158.9 LBS | HEART RATE: 76 BPM | RESPIRATION RATE: 18 BRPM | DIASTOLIC BLOOD PRESSURE: 68 MMHG | OXYGEN SATURATION: 98 % | HEIGHT: 73 IN

## 2021-07-22 LAB
METER GLUCOSE: 120 MG/DL (ref 74–99)
METER GLUCOSE: 131 MG/DL (ref 74–99)
METER GLUCOSE: 258 MG/DL (ref 74–99)
PRO-BNP: 571 PG/ML (ref 0–125)

## 2021-07-22 PROCEDURE — 2580000003 HC RX 258: Performed by: INTERNAL MEDICINE

## 2021-07-22 PROCEDURE — 6370000000 HC RX 637 (ALT 250 FOR IP): Performed by: STUDENT IN AN ORGANIZED HEALTH CARE EDUCATION/TRAINING PROGRAM

## 2021-07-22 PROCEDURE — 36415 COLL VENOUS BLD VENIPUNCTURE: CPT

## 2021-07-22 PROCEDURE — 83880 ASSAY OF NATRIURETIC PEPTIDE: CPT

## 2021-07-22 PROCEDURE — 82962 GLUCOSE BLOOD TEST: CPT

## 2021-07-22 PROCEDURE — 6370000000 HC RX 637 (ALT 250 FOR IP): Performed by: INTERNAL MEDICINE

## 2021-07-22 RX ORDER — SYRINGE-NEEDLE,INSULIN,0.5 ML 31 GX5/16"
1 SYRINGE, EMPTY DISPOSABLE MISCELLANEOUS DAILY
Qty: 100 EACH | Refills: 0 | Status: SHIPPED | OUTPATIENT
Start: 2021-07-22

## 2021-07-22 RX ORDER — SPIRONOLACTONE 25 MG/1
25 TABLET ORAL DAILY
Qty: 30 TABLET | Refills: 3 | Status: SHIPPED | OUTPATIENT
Start: 2021-07-22

## 2021-07-22 RX ORDER — PEN NEEDLE, DIABETIC 31 GX5/16"
1 NEEDLE, DISPOSABLE MISCELLANEOUS DAILY
Qty: 100 EACH | Refills: 3 | Status: SHIPPED | OUTPATIENT
Start: 2021-07-22 | End: 2021-07-22 | Stop reason: SDUPTHER

## 2021-07-22 RX ORDER — SYRINGE-NEEDLE,INSULIN,0.5 ML 31 GX5/16"
1 SYRINGE, EMPTY DISPOSABLE MISCELLANEOUS DAILY
Qty: 100 EACH | Refills: 0 | Status: SHIPPED | OUTPATIENT
Start: 2021-07-22 | End: 2021-07-22 | Stop reason: SDUPTHER

## 2021-07-22 RX ORDER — LANCETS 28 GAUGE
EACH MISCELLANEOUS
Qty: 100 EACH | Refills: 0 | Status: SHIPPED | OUTPATIENT
Start: 2021-07-22

## 2021-07-22 RX ORDER — ASPIRIN 81 MG/1
81 TABLET, CHEWABLE ORAL DAILY
Qty: 30 TABLET | Refills: 3 | Status: SHIPPED | OUTPATIENT
Start: 2021-07-23

## 2021-07-22 RX ORDER — LANCETS 28 GAUGE
EACH MISCELLANEOUS
Qty: 100 EACH | Refills: 0 | Status: SHIPPED | OUTPATIENT
Start: 2021-07-22 | End: 2021-07-22 | Stop reason: SDUPTHER

## 2021-07-22 RX ORDER — PEN NEEDLE, DIABETIC 31 GX5/16"
1 NEEDLE, DISPOSABLE MISCELLANEOUS DAILY
Qty: 100 EACH | Refills: 3 | Status: SHIPPED | OUTPATIENT
Start: 2021-07-22

## 2021-07-22 RX ORDER — PEN NEEDLE, DIABETIC 29 G X1/2"
1 NEEDLE, DISPOSABLE MISCELLANEOUS DAILY
Qty: 100 EACH | Refills: 0 | Status: SHIPPED | OUTPATIENT
Start: 2021-07-22 | End: 2021-07-22 | Stop reason: SDUPTHER

## 2021-07-22 RX ORDER — BUMETANIDE 1 MG/1
1 TABLET ORAL 2 TIMES DAILY
Qty: 30 TABLET | Refills: 3 | Status: SHIPPED | OUTPATIENT
Start: 2021-07-22

## 2021-07-22 RX ORDER — PEN NEEDLE, DIABETIC 29 G X1/2"
1 NEEDLE, DISPOSABLE MISCELLANEOUS DAILY
Qty: 100 EACH | Refills: 0 | Status: SHIPPED | OUTPATIENT
Start: 2021-07-22

## 2021-07-22 RX ORDER — BLOOD-GLUCOSE METER
1 KIT MISCELLANEOUS DAILY
Qty: 100 EACH | Refills: 0 | Status: SHIPPED | OUTPATIENT
Start: 2021-07-22 | End: 2021-07-22 | Stop reason: SDUPTHER

## 2021-07-22 RX ORDER — CARVEDILOL 12.5 MG/1
12.5 TABLET ORAL 2 TIMES DAILY WITH MEALS
Qty: 60 TABLET | Refills: 3 | Status: SHIPPED | OUTPATIENT
Start: 2021-07-22

## 2021-07-22 RX ORDER — ATORVASTATIN CALCIUM 80 MG/1
80 TABLET, FILM COATED ORAL NIGHTLY
Qty: 30 TABLET | Refills: 3 | Status: SHIPPED | OUTPATIENT
Start: 2021-07-22

## 2021-07-22 RX ORDER — BLOOD-GLUCOSE METER
1 KIT MISCELLANEOUS DAILY
Qty: 100 EACH | Refills: 0 | Status: SHIPPED | OUTPATIENT
Start: 2021-07-22

## 2021-07-22 RX ADMIN — Medication 10 ML: at 12:55

## 2021-07-22 RX ADMIN — SACUBITRIL AND VALSARTAN 1 TABLET: 24; 26 TABLET, FILM COATED ORAL at 12:56

## 2021-07-22 RX ADMIN — INSULIN LISPRO 6 UNITS: 100 INJECTION, SOLUTION INTRAVENOUS; SUBCUTANEOUS at 12:46

## 2021-07-22 RX ADMIN — CARVEDILOL 12.5 MG: 6.25 TABLET, FILM COATED ORAL at 12:56

## 2021-07-22 RX ADMIN — BUMETANIDE 1 MG: 1 TABLET ORAL at 12:56

## 2021-07-22 RX ADMIN — ASPIRIN 81 MG: 81 TABLET, CHEWABLE ORAL at 08:00

## 2021-07-22 RX ADMIN — INSULIN LISPRO 20 UNITS: 100 INJECTION, SOLUTION INTRAVENOUS; SUBCUTANEOUS at 12:48

## 2021-07-22 RX ADMIN — SPIRONOLACTONE 25 MG: 25 TABLET ORAL at 12:56

## 2021-07-22 ASSESSMENT — PAIN SCALES - GENERAL: PAINLEVEL_OUTOF10: 0

## 2021-07-22 NOTE — CARE COORDINATION
Pt and spouse requesting to speak to , spouse overwhelmed, states that pt is going blind and she is not sure how diabetes and glucometer work, plus the old meter they have [facility] does not cover the supplies anymore. Spoke with charge RN to contact attending to get new script for glucometer. Spoke with outpatient pharmacy, they stated possible Truemetrix, and on script write for or any meter that insurance will cover, updated charge RN. Family also inquiring about new physician and endocrinologist, gave a list of physicians to contact. Referral to [facility] for skilled nursing and diabetic ed.  Pt discharging home today, spouse to transport and go home with [employer], MSW, LSW

## 2021-07-22 NOTE — HOME CARE
[facility] received referral. Will follow after discharge. Spoke with patient and verified demographics. PATIENT AWARE THAT [facility]HC CAN NOT SEE HIM UNTIL 7/26/21.  AWARE [facility]HC CAN NOT SEE PATIENT UNTIL 7/26/21 AND [facility] ORDER NEEDS TO STATE FOR [facility] ON 7/26/21. Grzegorz Uriarte LPN, [facility].

## 2021-07-22 NOTE — DISCHARGE SUMMARY
Hospitalist Discharge Summary    Patient ID: Romelia Pisano III   Patient : 1980  Patient's PCP: Candi Sorenson MD    Admit Date: 2021   Admitting Physician: Lynn Whitley MD    Discharge Date:  2021   Discharge Physician: Leandra Garcia MD   Discharge Condition: Stable  Discharge Disposition: [facility] course in brief:  (Please refer to daily progress notes for a comprehensive review of the hospitalization by requesting medical records)    HPI per Dr. Sanchez Asp: 47 y.o. male who presented to [facility] with cardiomyopathy. Patient is transferred from [facility] after admission for CHF. Patient notes that over the last week he has had worsening shortness of breath. Associated with cough productive of clear sputum.  + Stuffy nose and sore throat. Similar to previous colds. Patient does note progressively worsening shortness of breath. Associated with leg swelling.  + Orthopnea and PND. Patient was admitted to [place]. He was evaluated by Cardiology and diuresed. Symptoms were moderate in severity and are largely improved. Echocardiogram reveals dilated LV with EF 10 to 15%. Patient is transferred to [address] [place] [address] for further evaluation and treatment. Patient denies family history of CHF or early CAD. He has remote history of methamphetamine use but denies any further use in the last couple years. Patient continues to smoke. He denies alcohol abuse. Hospital Course:    1) Acute on chronic systolic CHF/dilated cardiomyopathy. Idiopathic Cardiomyopathy. - Ischemic cardiomyopathy with severe 2-vessel disease on Cardiac Cath on 21  - TTE from the referring hospital showed EF of 10 to 15% with moderate mitral regurgitation  - Status post Lexiscan in [facility] which showed extensive anteroseptal septal thinning with EF of 18%. No ischemia.  - Status post lifevest placement  - Symptoms improved with IV diuresis.  Cont on Bumex 1mg PO BID  -  Cont on ASA, Statin, carvedilol 12.5 mg BID, Aldactone 25 QD and Entresto 24/26 BID  - HIV Negative  - CTS surgery c/s noted and appreciated; CABG likely not beneficial. Rec for PCI  - Cardiology c/s noted and appreciated; Pt okay to be evaluated as outpatient for PCI at [facility] main by high risk interventionalist. Pt advised to f//u at [facility] in a couple of weeks.    2) DM type 2  - Managed with basal, supplemental and prandial insulin coverage  - Farxiga to be started as OP due to additional benefit in patients with heart failure  - PCP as outpatient     3) Nicotine use disorder  - Counseled to quit  - Nicotine replacement. Patient seen and examined at bedside in NAD on RA. Pt denies any chest pain, palpitations or SOB. Appears euvolemic on exam. No PND or orthopnea. Ambulating without angina. Pt is hemodynamically stable for discharge. Consults:   IP CONSULT TO HEART FAILURE NURSE/COORDINATOR  IP CONSULT TO DIETITIAN  IP CONSULT TO CARDIOLOGY  IP CONSULT TO [address] SURGERY    Discharge Instructions / Follow up:    Future Appointments   Date Time Provider Liz Castle   8/11/2021  9:30 AM [facility] ROOM 1 [facility]   8/23/2021  2:00 PM Sudhir Haney MD [facility]       Continued appropriate risk factor modification of blood pressure, diabetes and serum lipids will remain essential to reducing risk of future atherosclerotic development    Activity: activity as tolerated    Significant labs:  CBC:   Recent Labs     07/20/21  0846 07/21/21  0806   WBC 9.3 7.9   RBC 5.84* 5.53   HGB 16.9* 15.7   HCT 50.4 48.2   MCV 86.3 87.2   RDW 13.4 13.4    306     BMP:   Recent Labs     07/20/21  0847 07/21/21  0806    136   K 4.7 4.6   CL 93* 96*   CO2 30* 31*   BUN 28* 31*   CREATININE 1.0 1.1   MG 2.1 2.3     LFT:  No results for input(s): PROT, ALB, ALKPHOS, ALT, AST, BILITOT, AMYLASE, LIPASE in the last 72 hours.   PT/INR: No results for input(s): INR, APTT in the last 72 hours.  BNP: No results for input(s): BNP in the last 72 hours. Hgb A1C:   Lab Results   Component Value Date    LABA1C 8.5 (H) 2021     Folate and B12: No results found for: Kayla Nevarez, No results found for: FOLATE  Thyroid Studies:   Lab Results   Component Value Date    TSH 1.290 2021       Urinalysis:    Lab Results   Component Value Date    WBCUA NEGATIVE 07/15/2021    RBCUA MODERATE 07/15/2021    GLUCOSEU NEGATIVE 07/15/2021       Imaging:  XR CHEST (2 VW)    Result Date: 2021  EXAMINATION: TWO XRAY VIEWS OF THE CHEST 2021 7:03 am COMPARISON: None. HISTORY: ORDERING SYSTEM PROVIDED HISTORY: pulmonary edema TECHNOLOGIST PROVIDED HISTORY: Reason for exam:->pulmonary edema What reading provider will be dictating this exam?->CRC FINDINGS: Cardiac silhouette is at the upper limit of normal in size. No infiltrate, effusion, or pneumothorax. No acute osseous abnormality. Cardiac silhouette is at the upper limit of normal in size. No pulmonary infiltrates or evidence of significant pulmonary edema.      XR CHEST (2 VW)    Result Date: 2021                                      [address] [place] [address]                                              [phone]                                                XRay Report                                                  Signed    Patient: Zac Coombs                                                                MR#: [id number]  0          : 1980                                                                [de-identified]            Age/Sex: 47 / Kat Peace Date: 07/15/21            Loc: 2             2029                                                                  Attending Dr: Sheryle Roulette DO    Ordering Physician: Rosalind Pak MD Date of Service: 21  Procedure(s): XR chest 2V  Accession Number(s): [id number]    cc: Fermin Hernandez MD      STAT REPORT   T2 / Todd Do notified of report at , 21 by Nina Silver. REPORT by Anaid Garrison. Kim Benitez MD (21  [id number])       PHYSICIAN INDICATIONS:  Dyspnea. TECHNIQUE: PA and lateral view. FINDINGS: The heart size is normal.       There is mild pulmonary vascular redistribution, small effusions and Kerley B lines. Correlate for   pulmonary edema. Findings are new from 2021. Remainder of lung fields are clear. IMPRESSION:   Chest, two-view:       Mild pulmonary vascular redistribution, small effusions and Kerley B lines. Correlate for   pulmonary edema. Findings are new from 2021. In view of history, suspect congestive heart failure.            Dictated ByMony Santiago MD   DD/DT: 21 0833               Signed By: Magdy Tenorio MD 21 1247            :    FL ESOPHAGRAM    Result Date: 7/15/2021                                      [address]                                              [address]                                              [phone]                                                XRay Report                                                  Signed    Patient: Keysha Francis                                                                MR#: [id number]  0          : 1980                                                                [de-identified]            Age/Sex: 47 / Joe Ruvalcaba Date: 07/15/21            Loc: 2T             -                                                                  Attending Dr: Jennifer Rivera Physician: Can Patterson MD   Date of Service: 21  Procedure(s): XR esophagram  Accession Number(s): [id number]    cc: Al Potter MD      HISTORY:  Difficulty swallowing. TECHNIQUE:  Plain film esophagram.           FINDINGS:  The esophagus is normal in caliber with no obstructing foreign body or mass. Impression       Esophagram, plain film:   1. No obstruction of the esophagus. In view of symptoms, recommend direct visualization. Dictated ByPrudencio Haseeb SPEARS   DD/DT: 07/15/21 0749               Signed By: Oxana Lock MD 07/15/21 0749            : [facility]    US GALLBLADDER RUQ    Result Date: 7/15/2021                                      [address] [place] [address]                                              [phone]                                             Ultrasound Report                                                  Signed    Patient: Hubert Yee                                                                MR#: [id number]  0          : 1980                                                                [de-identified]            Age/Sex: 47 / Yakelin Coleman Date: 07/15/21            Loc: 2T             2029                                                                  Attending Dr: Charles Rasheed DO    Ordering Physician: Paddy Hill DO   Date of Service: 07/15/21  Procedure(s): US gallbladder  Accession Number(s): [id number]    cc: Roni Landrum DO              PHYSICIAN INDICATIONS:  Abnormal LFT       TECHNIQUE: Ultrasound images through the gallbladder. FINDINGS:       The gallbladder is normal in size and appearance. There is no wall thickening or shadowing stones   identified. Biliary tree is not dilated. No adjacent fluid collections are noted. Visualized portions of the liver and pancreas are unremarkable. Peripherally calcified cystic mass right kidney as was noted on CT. IMPRESSION       Ultrasound Gallbladder:       1. Unremarkable. No gallstones identified. Dictated ByLino Morrissey MD   DD/DT: 07/15/21 0916               Signed By: Cassie Faulkner MD 07/15/21 0916            : [facility]    XR CHEST PORTABLE    Result Date: 7/15/2021                                      Sukh Alexander, [address] [place] [address]                                              [phone]                                                XRay Report                                                  Signed    Patient: Isreal Sanchez                                                                MR#: [id number]  0          : 1980                                                                [de-identified]            Age/Sex: 47 / Brianelene Ebbs Date: 07/15/21            Loc: 2             -1                                                                  Attending Dr: Sam Crane Physician: Yamel Soto MD   Date of Service: 21  Procedure(s): XR chest 1V portable  Accession Number(s): [id number]    cc: Yamel Soto MD      HISTORY:  Shortness of breath, cough       Comparison:  2020. PHYSICIAN INDICATIONS:  chest pain       TECHNIQUE:  XR chest 1V portable. FINDINGS:  The heart size is enlarged with some central vascular congestion. The lung fields are   clear. No infiltrates or effusions are identified. Mild coarsening of interstitial markings   greater than expected for reported age. IMPRESSION XR chest 1V portable:   Cardiomegaly with some central vascular congestion.                    Dictated By: Nirmala Hill DO    DD/DT: 07/15/21 0745 Signed By: Nirmala Hill DO 07/15/21 0745              : [facility]    CTA PULMONARY W CONTRAST    Result Date: 7/15/2021                                      [address], [address] [place], [place] [address]                                              [phone]                                               CT Scan Report                                                  Signed    Patient: Isreal Sanchez                                                                MR#: [id number]  0          : 1980                                                                [de-identified]            Age/Sex: 47 / Marcelene Ebbs Date: 07/15/21            Loc: 2             2029                                                                  Attending Dr: Rajeev John DO    Ordering Physician: Yamel Soto MD   Date of Service: 21  Procedure(s): CT angio chest PE protocol  Accession Number(s): [id number]    cc: Al Potter MD      HISTORY:  Chest pain and dyspnea. Difficulty swallowing. PHYSICIAN INDICATIONS:  Pulmonary Embolism. TECHNIQUE: High resolution axial CT images of the chest with three-dimensional reconstruction on   separate workstation. Study was performed with intravenous contrast for CT Angiogram.  CT Dose   Index: 3.7 - 14.8 mGy. DLP: 349 mGy-cm. AEC.  k=0.014 mSv/ (mGy-cm). FINDINGS: The pulmonary arteries appear normal in caliber without filling defects to suggest   pulmonary emboli. The aorta is normal in caliber without aneurysm, dissection or rupture. The heart size is   enlarged. The lung fields are clear. No pleural effusions are present. No lung nodules are identified. Few 5-15 mm pretracheal, paratracheal, subcarinal and hilar lymph nodes which are nonspecific. In the upper abdomen, visualized portions of the liver, adrenal glands, and spleen are   unremarkable. Esophagus is not thickened or dilated by CT. Direct visualization suggested in view of patient's   symptoms. 2 x 5 cm peripherally calcified cystic mass right kidney measuring 5 HU with subcapsular shape. Correlate for prior lithotripsy/trauma. Finding is stable from 12/20/2019 and most likely benign. If the patient has hypertension, additional workup would be recommended. IMPRESSION:   CT Angiogram Chest and Pulmonary Arteries with contrast and high resolution 2-D and 3-D images:       1. No evidence of pulmonary emboli by CT technique. 2. Lung fields are clear. 3. Heart size is enlarged. Additional workup is suggested. 4. Esophagus is not thickened or dilated by CT. Direct visualization is suggested in view of   patient's swallowing difficulty. 5. Few 5-15 mm pretracheal, paratracheal, subcarinal and hilar lymph nodes which are nonspecific. Consider follow-up. 6.  2 x 5 cm peripherally calcified cystic mass right kidney measuring 5 HU with subcapsular shape. Correlate for prior lithotripsy/trauma. Finding is stable from 12/20/2019 and most likely benign. If patient has hypertension, additional workup would be recommended. ED DISCREPANCY: Please make note of the above report with follow-up recommendations. Report sent   to Olivia Richards / Behzad Wilder RN, notified and the Emergency Department for follow-up at [address], 07/15/21. Preliminary report was sent to the Emergency Department at [place] [address], 07/15/21 by [address].            Dictated ByMiesha Freeman MD   DD/DT: 07/15/21 0616               Signed By: Elsa Galarza MD 07/15/21 0748            :    NM MYOCARDIAL SPECT REST EXERCISE OR RX    Result Date: 7/19/2021                                      [address]                                              [phone]                                          Nuclear Medicine Report                                                  Signed    Patient: Jose Dwyer                                                                MR#: [id number]  0          : 1980                                                                [de-identified]            Age/Sex: 47 / M                                                                Admit Date: 07/15/21            Loc: 2T             -1                                                                  Attending Dr: Jean Carlos Jfefrey DO    Ordering Physician: Bradley Harry MD   Date of Service: 21  Procedure(s): NM myocardial perf multi pharm  Accession Number(s): [id number]    cc: Fermin Hernandez MD      History:  Cardiomyopathy. Chest pain at rest and stress. Shortness of breath. Abnormal EKG. Diabetes. Smoker. Technique: Cardiac SPECT images with 10 mCi Tc-99m Sestamibi at Rest and 30 mCi at Stress. The   stress method was Lexiscan. A gated cardiac SPECT examination was performed following the stress   procedure. Findings: An unmatched stress defect is seen in the anterior wall near apex. This suggests mild   ischemia. A matched defect is seen in the inferior wall and septum. This suggests a moderate area   of infarction. There appears to be abnormal myocardial wall motion on the gated images performed   after the stress procedure with diffuse hypokinesis. The left ventricular ejection fraction   measures 18%. Impression:   1. There is evidence of mild ischemia of the anterior wall near apex. 2.  There is evidence of a moderate area of infarction in the inferior wall and septum. 3.  There is diffuse abnormal myocardial wall motion on the gated examination.        4.  The left ventricular ejection fraction measures 18% which is markedly reduced. Call report per Dr. Chiqui Arredondo. Report called and sent to Dr. Sherry Yu [facility] notified at   8496, 07/19/21. Please show results to doctor. Dictated ByRomel Mays MD   DD/DT: 07/19/21 0931               Signed By: Keven Fabian MD 07/19/21 0931            :      Discharge Medications:      Medication List      START taking these medications    aspirin 81 MG chewable tablet  Take 1 tablet by mouth daily  Start taking on: July 23, 2021     atorvastatin 80 MG tablet  Commonly known as: LIPITOR  Take 1 tablet by mouth nightly     bumetanide 1 MG tablet  Commonly known as: BUMEX  Take 1 tablet by mouth 2 times daily     carvedilol 12.5 MG tablet  Commonly known as: COREG  Take 1 tablet by mouth 2 times daily (with meals)     sacubitril-valsartan 24-26 MG per tablet  Commonly known as: ENTRESTO  Take 1 tablet by mouth 2 times daily     spironolactone 25 MG tablet  Commonly known as: ALDACTONE  Take 1 tablet by mouth daily        CONTINUE taking these medications    B-D INS SYRINGE 0.5CC/31GX5/16 31G X 5/16\" 0.5 ML Misc  Generic drug: Insulin Syringe-Needle U-100     * B-D ULTRAFINE III SHORT PEN 31G X 8 MM Misc  Generic drug: Insulin Pen Needle     * PEN NEEDLES 29GX1/2\" 29G X 12MM Misc     FreeStyle Lancets Misc     FREESTYLE LITE strip  Generic drug: blood glucose test strips     ibuprofen 200 MG tablet  Commonly known as: ADVIL;MOTRIN     insulin glargine 100 UNIT/ML injection vial  Commonly known as: LANTUS     NovoLOG 100 UNIT/ML injection vial  Generic drug: insulin aspart     Ventolin  (90 Base) MCG/ACT inhaler  Generic drug: albuterol sulfate HFA         * This list has 2 medication(s) that are the same as other medications prescribed for you. Read the directions carefully, and ask your doctor or other care provider to review them with you.             STOP taking these medications    lisinopril 5 MG tablet  Commonly known as: PRINIVIL;ZESTRIL           Where to Get Your Medications      Information about where to get these medications is not yet available    Ask your nurse or doctor about these medications  · aspirin 81 MG chewable tablet  · atorvastatin 80 MG tablet  · bumetanide 1 MG tablet  · carvedilol 12.5 MG tablet  · sacubitril-valsartan 24-26 MG per tablet  · spironolactone 25 MG tablet         Time Spent on discharge is more than 45 minutes in the examination, evaluation, counseling and review of medications and discharge plan.    +++++++++++++++++++++++++++++++++++++++++++++++++  Tiff Pierce MD  [address]  +++++++++++++++++++++++++++++++++++++++++++++++++  NOTE: This report was transcribed using voice recognition software. Every effort was made to ensure accuracy; however, inadvertent computerized transcription errors may be present.

## 2021-07-22 NOTE — PROGRESS NOTES
[facility] [address]    Name: Danna Bojorquez III    Age: 47 y.o. PCP: Vonnie Lefort, MD    Date of Admission: 7/16/2021 10:44 PM    Date of Service: 7/22/2021    Chief Complaint: Follow-up for acute systolic HF  He is being followed for acute systolic heart failure. Admitted to [facility] last week with pulmonary edema requiring diuresis. Echo showed severe LV dilatation with EF 10 to 15% and moderate mitral regurgitation. [place] showed extensive anteroseptal thinning and EF 18%. LifeVest placed at [facility] and he was transferred here for further care. 2 L negative and chest x-ray yesterday morning showed no further pulmonary edema.     Hypertension, longstanding insulin requiring diabetes mellitus for over 20 years. Questionable hyperlipidemia.     Interim History:  No new overnight cardiac complaints. Currently with no complaints of CP, SOB, palpitations, dizziness, or lightheadedness. Feels better . In droplet isolation for rhinovirus parainfluenza has been in hospital since 7/14. cathed 7/20 by Dr Kitty Maldonado. Reviewed films and report -ostial PDA 80%, mid LAD between D2 and D3 about 80 %, overall appears to have big vessels and not a lot of other luminal narrowing. Discussed with Dr Kitty Maldonado this morning who feels this is a complex high risk lesion and with poor EF would need impella support with a risk of loss of the 2 diagonal branches. This would not be able to be done today. Surgery also saw patient and declined CABG.      Telemetry personally reviewed and showed sinus      Review of Systems:   Cardiac: As per HPI  General: No fever, chills  Pulmonary: No cough, wheeze, or shortness of breath  GI: No nausea, vomiting,or abdominal pain  Neuro: No headache or confusion    Problem List:  Active Problems:    Heart failure, systolic, with acute decompensation (HCC)    Type 1 diabetes mellitus ([address]    Hypertension    Cardiomyopathy ([facility]    Parainfluenza virus infection    Rhinovirus infection    Tobacco abuse  Resolved Problems:    * No resolved hospital problems.  *      Past Medical History:  Past Medical History:   Diagnosis Date    Arthritis     hand, wrists    Bleeding of eye     right    [facility]) [facility]     Diabetes ([facility]     Hypertension     Neuropathy in diabetes ([facility]     legs       Allergies:  No Known Allergies    Current Medications:  Current Facility-Administered Medications   Medication Dose Route Frequency Provider Last Rate Last Admin    aspirin chewable tablet 81 mg  81 mg Oral Daily Amber Fry MD   81 mg at 07/22/21 0800    atorvastatin (LIPITOR) tablet 80 mg  80 mg Oral Nightly Amber Fry MD   80 mg at 07/21/21 2214    carvedilol (COREG) tablet 12.5 mg  12.5 mg Oral BID  Estrellita Kohler MD   12.5 mg at 07/21/21 1656    perflutren lipid microspheres (DEFINITY) injection 1.65 mg  1.5 mL Intravenous ONCE PRN CEE [place]        bumetanide (BUMEX) tablet 1 mg  1 mg Oral BID Amber Fry MD   1 mg at 07/21/21 1802    sodium chloride flush 0.9 % injection 5-40 mL  5-40 mL Intravenous 2 times per day Etelvina Mello MD   10 mL at 07/20/21 2217    sodium chloride flush 0.9 % injection 5-40 mL  5-40 mL Intravenous PRN Etelvina Mello MD        0.9 % sodium chloride infusion  25 mL Intravenous PRN Etelvina Mello MD        acetaminophen (TYLENOL) tablet 650 mg  650 mg Oral Q4H PRN Etelvina Mello MD   650 mg at 07/20/21 2211    sacubitril-valsartan (ENTRESTO) 24-26 MG per tablet 1 tablet  1 tablet Oral BID Ezra Elder MD   1 tablet at 07/21/21 2214    insulin lispro (HUMALOG) injection vial 20 Units  20 Units Subcutaneous TID KEVEN William MD   20 Units at 07/21/21 1656    spironolactone (ALDACTONE) tablet 25 mg  25 mg Oral Daily Ezra Elder MD   25 mg at 07/21/21 0800    insulin glargine (LANTUS) injection vial 35 Units  35 Units Subcutaneous [facility] Carina William MD   35 Units at 07/21/21 0800    sodium chloride flush 0.9 % injection 5-40 mL  5-40 mL Intravenous 2 times per day Mesha Montgomery MD   10 mL at 07/21/21 2214    sodium chloride flush 0.9 % injection 10 mL  10 mL Intravenous PRN Mesha Montgomery MD        0.9 % sodium chloride infusion  25 mL Intravenous PRN Mesha Montgomery MD        ondansetron (ZOFRAN-ODT) disintegrating tablet 4 mg  4 mg Oral Q8H PRN Mesha Montgomery MD        Or    [facility]ron [facility] injection 4 mg  4 mg Intravenous Q6H PRN Mesha Montgomery MD   4 mg at 07/17/21 2129    magnesium hydroxide (MILK OF MAGNESIA) 400 MG/5ML suspension 30 mL  30 mL Oral Daily PRN Mesha Montgomery MD        acetaminophen (TYLENOL) tablet 650 mg  650 mg Oral Q6H PRN Mesha Montgomery MD        Or    acetaminophen (TYLENOL) suppository 650 mg  650 mg Rectal Q6H PRN Mesha Montgomery MD        enoxaparin (LOVENOX) injection 40 mg  40 mg Subcutaneous Daily Mesha Montgomery MD   40 mg at 07/21/21 0800    glucose (GLUTOSE) 40 % oral gel 15 g  15 g Oral PRN Mesha Montgomery MD        dextrose 50 % IV solution  12.5 g Intravenous PRN Mesha Montgomery MD        glucagon (rDNA) injection 1 mg  1 mg Intramuscular PRN Mesha Montgomery MD        dextrose 5 % solution  100 mL/hr Intravenous PRN Mesha Montgomery MD        insulin lispro (HUMALOG) injection vial 0-12 Units  0-12 Units Subcutaneous TID WC Mesha Montgomery MD   10 Units at 07/21/21 1126    insulin lispro (HUMALOG) injection vial 0-6 Units  0-6 Units Subcutaneous Nightly Mesha Montgomery MD   1 Units at 07/20/21 2210      sodium chloride      sodium chloride      dextrose         Physical Exam:  /68   Pulse 76   Temp 96.9 °F (36.1 °C) (Temporal)   Resp 18   Ht 6' 1\" (1.854 m)   Wt 158 lb 14.4 oz (72.1 kg)   SpO2 98%   BMI 20.96 kg/m²   Weight change: -4 lb 1.6 oz (-1.86 kg)  Wt Readings from Last 3 Encounters:   07/22/21 158 lb 14.4 oz (72.1 kg)   11/28/17 175 lb (79.4 kg)     General: Awake, alert, oriented x3, no acute distress  Neck: No JVD, carotid bruits, thyromegaly, or lymphadenopathy  Cardiac: Regular rate and rhythm, normal S1 and split S2, no murmurs, no S3 or S4, no pericardial rubs. Carotid upstrokes brisk  Resp: clear bilaterally without wheezes, rhonchi, or rales; unlabored respirations  Abdomen: soft, nontender, nondistended, BS+; no masses, bruits, or hepatomegaly  Extremities: no cyanosis, clubbing, or edema. Distal pulses intact, right wrist looks ok cath site  Skin: Warm and dry, no rashes or lesions, + tatoos  Neuro: moves all extremities to command, no focal deficits noted    Intake/Output:    Intake/Output Summary (Last 24 hours) at 7/22/2021 1133  Last data filed at 7/22/2021 0514  Gross per 24 hour   Intake 240 ml   Output 900 ml   Net -660 ml     No intake/output data recorded. Laboratory Tests:  Last 3 CBC:  Recent Labs     07/20/21  0846 07/21/21  0806   WBC 9.3 7.9   RBC 5.84* 5.53   HGB 16.9* 15.7   HCT 50.4 48.2   MCV 86.3 87.2   MCH 28.9 28.4   MCHC 33.5 32.6   RDW 13.4 13.4    306   MPV 9.7 9.5       Last 3 CMP:    Recent Labs     07/20/21  0847 07/21/21  0806    136   K 4.7 4.6   CL 93* 96*   CO2 30* 31*   BUN 28* 31*   CREATININE 1.0 1.1   GLUCOSE 324* 200*   CALCIUM 8.6 8.6       Last 3 Mag/Phos:  Recent Labs     07/20/21  0847 07/21/21  0806   MG 2.1 2.3       Last 3 CK, CKMB, Troponin  No results for input(s): CKTOTAL, CKMB, TROPONINI in the last 72 hours. Last 3 BNP:  No results for input(s): BNP in the last 72 hours. No results found for: BNP    Last 3 Glucose:     Recent Labs     07/20/21  0847 07/21/21  0806   GLUCOSE 324* 200*       Last 3 Coags:  No results for input(s): PROTIME, INR, PTT in the last 72 hours. No results found for: PROTIME, INR, PTT    Last 3 Lipid Panel:  No results for input(s): LDLCALC, HDL, TRIG in the last 72 hours.     Invalid input(s): CHLPL  Lab Results   Component Value Date    [address] 66 07/17/2021     Lab Results   Component Value Date    HDL 29 07/17/2021    HDL 31 07/16/2021    HDL 32 07/15/2021     Lab Results   Component Value Date    TRIG 74 07/17/2021    TRIG 44 07/16/2021    TRIG 67 07/15/2021     No components found for: CHLPL    TSH:  No results for input(s): TSH in the last 72 hours. Lab Results   Component Value Date    TSH 1.290 07/17/2021           Radiology:  XR CHEST (2 VW)   Final Result   Cardiac silhouette is at the upper limit of normal in size. No pulmonary   infiltrates or evidence of significant pulmonary edema. ASSESSMENT / PLAN:  #1.  Acute systolic heart failure-now euvolemic , changed to oral bumetanide 1 mg twice daily.     #2. Severe cardiomyopathy-continue carvedilol and add Entresto 24/26 mg twice daily to spironolactone which he already is on. Life vest in room. 2 significant blockages, the LAD is a complex stenosis which put 2 diagonals at risk and after discussion with interventionalist and patient about the risk, the patient would rather have this done in [facility] His is clinically stable, still has good CLIFFORD III flow, and no angina or obvious ischemia on SPECT-would continue our medical management. He is stable for home and will refer him to [facility] to a high risk interventionalist. Will have him f/u at [facility] in a couple weeks. He may eat. #3. Hypertension-controlled. Medication changes as above.     #4.   Diabetes-per hospitalist.    #5 URI- has been in droplet isolation since arrival originally came [address] [place] [address] 7/14             Pedro Ornelas MD, MD, [address] [place] [address] at [facility]    Electronically signed by Pedro Ornelas MD on 7/22/2021 at 11:33 AM

## 2021-07-22 NOTE — CARE COORDINATION
Chart reviewed, pt seen by CTS, no CABG; cardiology planning PCI and titrating meds, pt currently NPO for possible PCI today; discharge plan remains home with Lifevest, and spouse Fifi to transport once medically stable.

## 2021-07-22 NOTE — CONSULTS
Patient currently admitted with diagnosis of Systolic heart failure. Patient laying in bed for consultation. This is a new diagnosis for [place] Nahid. He was engaged and asked appropriate questions throughout the education session. He is agreeable to scheduling at the CHF clinic as well as his PCP and [facility] cardiology. He was very motivated to follow self management guidelines for his new medical condition. Future Appointments   Date Time Provider Liz Castle   8/11/2021  9:30 AM [facility] CHF ROOM 1 [facility]   8/23/2021  2:00 PM Gr[facility]s, [facility]            We reviewed the introduction to Heart Failure, the HF zones, signs and symptoms to report on day 1 of onset, medications, medication compliance, the importance of obtaining daily weights, following a low sodium diet, reading food labels for the sodium content, keeping physician appointments, and smoking cessation. We discussed writing / tracking daily weights on a calendar / log because a 5 pound gain in 1 week can sneak up if you are not tracking it. You can also take your charted weights to your doctor appointments to be reviewed. Contributing risk factors for Heart Failure are identified as overwhelmed with complexity of new diagnosis. I advised patient they can reduce the risk for Heart Failure exacerbations by modifying / controlling the risk factors. We discussed self-managed care which includes the following:  to take medications as prescribed, report any intolerable side effects of medications to the cardiologist / doctor, do not just stop taking the medication; follow a cardiac heart healthy / low sodium diet; weigh yourself daily, exercise regularly- per doctor recommendation and not to smoke or use an excess amount of alcohol. We discussed calling the cardiologist / doctor with a weight gain of 3 pounds in one day or a total of 5 pounds or more in one week.  Also, if you should have a significant weight loss of 3# or more in one day to call the doctor, they may need to decrease or hold the diuretic dose. On days you feels nauseated and not eating / drinking, having emesis or diarrhea,  informed to call the cardiologist  / doctor, they may need to decrease or hold diuretic to avoid dehydration. I stressed the importance of informing their cardiologist the first day of onset of any of the signs and symptoms in the \"Yellow Zone\" of the HF Zones. Patient verbalizes understanding. Greater than 30 minutes was spent educating patient. Echocardiogram: Heart Cath completed on 7/20/21    BNP:   Lab Results   Component Value Date    PROBNP 571 (H) 07/22/2021       History of:    has a past medical history of Arthritis, Bleeding of eye, CHF (congestive heart failure) ([facility] Diabetes ([facility] Hypertension, and Neuropathy in diabetes ([facility] has no past surgical history on file. Medications:    aspirin  81 mg Oral Daily    atorvastatin  80 mg Oral Nightly    carvedilol  12.5 mg Oral BID WC    bumetanide  1 mg Oral BID    sodium chloride flush  5-40 mL Intravenous 2 times per day    sacubitril-valsartan  1 tablet Oral BID    insulin lispro  20 Units Subcutaneous TID WC    spironolactone  25 mg Oral Daily    insulin glargine  35 Units Subcutaneous QAM    sodium chloride flush  5-40 mL Intravenous 2 times per day    enoxaparin  40 mg Subcutaneous Daily    insulin lispro  0-12 Units Subcutaneous TID WC    insulin lispro  0-6 Units Subcutaneous Nightly      sodium chloride      sodium chloride      dextrose         Code Status:Full Code    The patient is ordered:  Diet: ADULT DIET;  Regular; Low Fat/Low Chol/High Fiber/2 gm Na   Sodium controlled diet Yes  Fluid restriction daily ordered (fluid restriction recommended if patient is hyponatremic and/or diuretic is initiated or increased) No  FR:   Daily Weights:   Patient Vitals for the past 96 hrs (Last 3 readings):   Weight   07/22/21 0514 158 lb 14.4 oz (72.1 kg)   07/21/21 0600 163 lb (73.9 kg)   07/20/21 0600 156 lb 1.6 oz (70.8 kg)     I/O:     Intake/Output Summary (Last 24 hours) at 7/22/2021 1253  Last data filed at 7/22/2021 0514  Gross per 24 hour   Intake 240 ml   Output 900 ml   Net -660 ml        The Heart Failure Booklet given to the patient with additional patient education addressing:  · What is Heart Failure? · Things You Can Do to Live Well with HF  · How to Take Your Medications  · How to Eat Less Salt  · Exercising Well with Heart Failure  · Signs and symptoms of HF to report  · Weight Yourself Each Day  · Home Self Management- activity, weight tracking, taking medications as prescribed, meals /diet planning (sodium and fluid restriction), how to read food labels, keeping physician follow ups, smoking cessation, follow the Heart Failure Zones  · The Heart Failure zones  · Sodium content pamphlet  · What foods I should avoid tip sheet    Instructed  to call 911 if you have any of the following symptoms:    ·    Struggling to breathe unrelieved with rest,  ·    Having chest pain, confusion or can't think clearly  ·    Have confusion or cant think clearly    Readmission Risk Score: 7        Discharge Plan:  I placed the Heart Failure Home Instructions in patient's discharge instructions. Per Heart Failure GWTG, the patient should have a follow-up appointment made within 7 days of discharge.     Nohemi Lopes RN BSN, RN  Heart Failure Navigator        CONGESTIVE HEART FAILURE (CHF) GUIDELINES  (Must be completed for Primary Diagnosis CHF or History of CHF)    Type of CHF:    [] Acute   [] Chronic     [] Acute on Chronic     Ventricular Function Assessment (check):             [] HFpEF  [] HFpEF, borderline  [] HFpEF, improved  [] HFrEF  Ejection Fraction (%):    15%                                                                  Angiotensin-Converting-Enzyme (ACE) inhibitor ordered:  [] Yes  [x] No (specify contraindication):  [] Renal Insufficiency  [] Cough  [] Hypotension  [] Allergy/angioedema  [] No left ventricular systolic dysfunction (LVSD)  [] Hyperkalemia  [] Moderate to severe aortic stenosis  [] Other (Specify):     Angiotensin II receptor blockers (ARB) ordered:  [] Yes  [x] No (specify contraindication):  [] Renal Insufficiency  [] Hypotension  [] Allergy/angioedema  [] No LVSD  [] Hyperkalemia  [] Moderate to severe aortic stenosis  [] Other (Specify):      ARNI - Angiotensin Receptor Neprilysin Inhibitor ordered:  [x] Yes  [] No      ACC/AHA Guidelines Beta Blocker (Carvedilol, Metoprolol Succinate, or Bisoprolol) ordered:    [x] Yes  [] No (specify contraindication):  [] Bradycardia  [] Hypotension  [] LVD  [] 2nd or 3rd degree heart block  [] Bronchospastic airway disease  [] Decompensated CHF  [] Other (Specify):

## 2021-07-22 NOTE — PLAN OF CARE
Problem: Falls - Risk of:  Goal: Will remain free from falls  Description: Will remain free from falls  Outcome: Completed  Goal: Absence of physical injury  Description: Absence of physical injury  Outcome: Completed     Problem: Cardiac:  Goal: Ability to maintain vital signs within normal range will improve  Description: Ability to maintain vital signs within normal range will improve  Outcome: Completed  Goal: Cardiovascular alteration will improve  Description: Cardiovascular alteration will improve  Outcome: Completed     Problem: Health Behavior:  Goal: Will modify at least one risk factor affecting health status  Description: Will modify at least one risk factor affecting health status  Outcome: Completed  Goal: Identification of resources available to assist in meeting health care needs will improve  Description: Identification of resources available to assist in meeting health care needs will improve  Outcome: Completed     Problem: Physical Regulation:  Goal: Complications related to the disease process, condition or treatment will be avoided or minimized  Description: Complications related to the disease process, condition or treatment will be avoided or minimized  Outcome: Completed     Problem: OXYGENATION/RESPIRATORY FUNCTION  Goal: Patient will maintain patent airway  Outcome: Completed  Goal: Patient will achieve/maintain normal respiratory rate/effort  Description: Respiratory rate and effort will be within normal limits for the patient  Outcome: Completed     Problem: HEMODYNAMIC STATUS  Goal: Patient has stable vital signs and fluid balance  Outcome: Completed     Problem: FLUID AND ELECTROLYTE IMBALANCE  Goal: Fluid and electrolyte balance are achieved/maintained  Outcome: Completed     Problem: ACTIVITY INTOLERANCE/IMPAIRED MOBILITY  Goal: Mobility/activity is maintained at optimum level for patient  Outcome: Completed     Problem: Pain:  Goal: Pain level will decrease  Description: Pain level will decrease  Outcome: Completed  Goal: Control of acute pain  Description: Control of acute pain  Outcome: Completed  Goal: Control of chronic pain  Description: Control of chronic pain  Outcome: Completed

## 2021-08-18 NOTE — ADT AUTH CERT
Utilization Reviews       Heart Failure - Care Day 6 (7/21/2021) by Glenna Hoffman RN       Review Status Review Entered   Completed 7/23/2021 07:12      Criteria Review      Care Day: 6 Care Date: 7/21/2021 Level of Care: Intermediate Care    Guideline Day 3    Clinical Status    (X) * Hemodynamic stability    7/23/2021 7:12 AM EDT by Urmila Marcus      /69   Pulse 83   Temp 96.9 °F (36.1 °C) (Temporal)   Resp 16    (X) * Tachypnea absent    (X) * Oxygenation at baseline or acceptable for next level of care    7/23/2021 7:12 AM EDT by Urmila Marcus      97% RA    (X) * Dyspnea at baseline or acceptable for next level of care    7/23/2021 7:12 AM EDT by Urmila Marcus      unlabored respirations    (X) * Cardiac rate and rhythm acceptable    7/23/2021 7:12 AM EDT by Urmila Marcus      Regular rate and rhythm, normal S1 and split S2    (X) * Pulmonary edema absent or acceptable for next level of care    (X) * Peripheral or sacral edema absent or acceptable for next level of care    (X) * Mental status at baseline    7/23/2021 7:12 AM EDT by Urmila Marcus      A&O    (X) * Volume status acceptable on oral medication    (X) * Renal function at baseline or acceptable for next level of care    7/23/2021 7:12 AM EDT by Urmila Marcus      Cr 1.1 , BUN 31    (X) * Electrolyte abnormalities absent or acceptable for next level of care    7/23/2021 7:12 AM EDT by Urmila Marcus      [id number]/L   Potassium4.6mmol/L   [id number]/L       ( ) * Precipitating factors absent or controlled    ( ) * Discharge plans and education understood    Activity    ( ) * Ambulatory or acceptable for next level of care    Routes    (X) * Oral hydration    7/23/2021 7:12 AM EDT by Urmila Marcus      tolerating PO    ( ) * Oral medications or regimen acceptable for next level of care    (X) * Oral diet or acceptable for next level of care    7/23/2021 7:12 AM EDT by Urmila Marcus      Low Fat/Low Chol/High Fiber/2 gm Na    Interventions    (X) * Oxygen absent    (X) Weigh    7/23/2021 7:12 AM EDT by Lysbe       163 lb    Medications    (X) Diuretics    7/23/2021 7:12 AM EDT by Lysbe       bumex 1mg po  bid    (X) Beta-blocker    7/23/2021 7:12 AM EDT by Lysbe       coreg 12.5mg po bid    * Milestone   Additional Notes   7/21    Intermediate care unit      MEDS   aspirin 81 mg Oral Daily    · atorvastatin 80 mg Oral Nightly   · bumetanide 1 mg Oral BID   · sodium chloride flush 5-40 mL Intravenous 2 times per day   · sacubitril-valsartan 1 tablet Oral BID   · insulin lispro 20 Units Subcutaneous TID WC   · carvedilol 6.25 mg Oral BID WC   · spironolactone 25 mg Oral Daily   · insulin glargine 35 Units Subcutaneous QAM   · sodium chloride flush 5-40 mL Intravenous 2 times per day   · enoxaparin 40 mg Subcutaneous Daily   · insulin lispro 0-12 Units Subcutaneous TID WC   · insulin lispro 0-6 Units Subcutaneous Nightly          *Internal MD note   . Pt denies any chest pain, palpitations or SOB. Appears euvolemic on exam. No PND or orthopnea. Pt s/p cardiac cath on 07/20 with severe 2-vessel disease. ASSESSMENT AND PLAN:   1) Acute on chronic systolic CHF/dilated cardiomyopathy   - Ischemic cardiomyopathy with severe 2-vessel disease on Cardiac Cath on 07/20/21   - TTE from the referring hospital showed EF of 10 to 15% with moderate mitral regurgitation   - Status post Lexiscan in [facility] hospital which showed extensive anteroseptal septal thinning with EF of 18%. No ischemia.   - Status post lifevest placement   - Symptoms improved with IV diuresis.  Cont on Bumex 1mg PO BID   - Cardiology on board, cont on ASA, Statin, carvedilol 6.25 mg BID, Aldactone 25 QD and Entresto 24/2 BID   - F/U HIV   - CTS to eval for CABG vs PCI       2) DM type 2   - Continue basal, supplemental and prandial insulin coverage   - Farxiga to be started as OP due to additional benefit in patients with heart failure      Cardio note   cathed yesterday afternoon by Dr Thierry Whaley. Reviewed films and report -ostial PDA 80%, mid LAD between D2 and D3 about 80 %, overall appears to have big vessels and not a lot of other luminal narrowing. Apparently equivocal  CABG vs PCI, but CT has not been consulted yet. ASSESSMENT / PLAN:   #1.  Acute systolic heart failure-now euvolemic , changed to oral bumetanide 1 mg twice daily.       #2. Pat Thea cardiomyopathy-continue carvedilol and add Entresto 24/26 mg twice daily to spironolactone which he already is on. Life vest in room.  Looks like 2 significant blockages in a 20 year diabetic. Would ask CT surgery for an opinion regarding his best option. LV dysfunction does look out of proportion to disease burden, titrate meds as tolerate, reassess LV function after 3 months of therapy. *Thoracic surgeon consult*   Cath note   IMPRESSION:   1.  Increased right-sided filling pressure. 2.  Significantly elevated mean PA pressure at 42 mmHg. 3.  Elevated wedge pressure at 29 mmHg. 4.  Elevated LVEDP at 25 mmHg. 5.  Severe two-vessel CAD.       RECOMMENDATIONS   1.  Aggressive medical therapy. 2.  Guideline-directed medical therapy.    3.  CABG versus PCI.       Assessment/Plan   48 yo male admitted with CHF symptoms noted to have likely idiopathic cardiomyopathy with EF of 10%    [facility] revelead lesions in the LAD and PDA which does not correlate to his degree of cardiomyopathy    Would be hesitant to do bypass at this point as his cardiomyopathy may very well not be improved and the risk would far outweigh the reward    Could possibly consider PCI if feasible + medical therapy      Heart Failure - Care Day 4 (7/19/2021) by Susanna Montoya RN       Review Status Review Entered   Completed 7/23/2021 07:06      Criteria Review      Care Day: 4 Care Date: 7/19/2021 Level of Care: Intermediate Care    Guideline Day 2    Level Of Care    (X) Floor    7/23/2021 7:06 AM EDT by [facility] care unit    Clinical Status    (X) * Hemodynamic stability    7/23/2021 7:06 AM EDT by Claire Medrano      /82   Pulse 80   Temp 96.5 °F (35.8 °C) (Temporal)   Resp 20   Ht 6' 1\" (1.854 m)   Wt 164 lb 6 oz (74.6 kg)   SpO2 98%    (X) * Mental status at baseline    7/23/2021 7:06 AM EDT by Claire Medrano      awake, alert and following commands    (X) * No evidence of myocardial ischemia    (X) * Cardiac rate and rhythm acceptable    7/23/2021 7:06 AM EDT by Claire Medrano      Regular rate rhythm, normal S1-S2    (X) * Oxygenation at baseline or improved    7/23/2021 7:06 AM EDT by Claire Medrano      97% RA    (X) * Pulmonary edema absent or improved    Routes    (X) Oral diet    7/23/2021 7:06 AM EDT by Claire Medrano      4 carb choices (60 gm/meal);  Low Sodium (2 gm) diet    Interventions    (X) * Pulmonary catheter absent    (X) Weigh    7/23/2021 7:06 AM EDT by Claire Medrano      164 lb    Medications    (X) Diuretics    7/23/2021 7:06 AM EDT by Claier Medrano      bumex 1mg iv bid    (X) Beta-blocker    7/23/2021 7:06 AM EDT by Claire Medrano      coreg 6.25mg po bid    * Milestone   Additional Notes   7/19  [facility]  Intermediate care unit      MEDS   sacubitril-valsartan 1 tablet Oral BID    · insulin lispro 20 Units Subcutaneous TID WC   · bumetanide 1 mg Intravenous BID   · carvedilol 6.25 mg Oral BID WC   · spironolactone 25 mg Oral Daily   · insulin glargine 35 Units Subcutaneous QAM   · sodium chloride flush 5-40 mL Intravenous 2 times per day   · enoxaparin 40 mg Subcutaneous Daily   · insulin lispro 0-12 Units Subcutaneous TID WC   · insulin lispro 0-6 Units Subcutaneous Nightly      *Internal MD note*   Leg swelling significantly improved   No PND and orthopnea   No acute events overnight   ASSESSMENT AND PLAN:   1) Acute on chronic systolic CHF/dilated cardiomyopathy   -Due to Ischemic versus nonischemic cardiomyopathy   -TTE from the referring hospital showed EF of 10 to 15% with moderate mitral regurgitation   -Status post [facility]iscan in [facility] which showed extensive anteroseptal septal thinning with EF of 18%.  No ischemia   -Status post lifevest placement   -Symptoms improved with diuresis   -Cardiology on board, started on carvedilol 6.25 mg twice daily, Aldactone 25 daily and Entresto    -Plan is for [facility] later in the week per cardiology       2) DM type 2   -Continue basal, supplemental and prandial insulin coverage   -Farxiga to be started as OP due to additional benefit in patients with heart failure      *Cardio note   ASSESSMENT / PLAN:   #1.  Acute systolic heart failure-now euvolemic , changed tooral bumetanide 1 mg twice daily.       #2. Flonnie Genaro cardiomyopathy-continue carvedilol and add Entresto 24/26 mg twice daily to spironolactone which he already is on.  Will likely need right and left heart cardiac catheterization once he is euvolemic- says he can lay flat.  Plan to perform later on this week. [employer] indication 93,score7. Keep NPO after midnight   #3.  Hypertension-controlled.  Medication changes as above.    #4Gearline Spar hospitalist.   #5 URI- has been in droplet isolation since arrival originally came [address] [place] [address] 7/14      Heart Failure - Care Day 2 (7/17/2021) by Sherri Epley, RN       Review Status Review Entered   Completed 7/19/2021 10:05      Criteria Review      Care Day: 2 Care Date: 7/17/2021 Level of Care: Intermediate Care    Guideline Day 2    Level Of Care    (X) Floor    7/19/2021 10:05 AM EDT by Hayley Rutledge      intermediate care unit    Clinical Status    (X) * Hemodynamic stability    7/19/2021 10:05 AM EDT by Hayley Rutledge      /80   Pulse 85   Temp 96.2 °F (35.7 °C) (Oral)   Resp 18    (X) * Mental status at baseline    7/19/2021 10:05 AM EDT by Hayley Rutledge      awake, alert and following commands    (X) * No evidence of myocardial ischemia    (X) * Cardiac rate and rhythm acceptable    7/19/2021 10:05 AM EDT by Hayley Rutledge      Regular rate rhythm, normal S1-S2    (X) * Oxygenation at baseline or improved    7/19/2021 10:05 AM EDT by Wilcox Bound      sat 90s on RA    ( ) * Pulmonary edema absent or improved    Routes    (X) Oral diet    7/19/2021 10:05 AM EDT by Wilcox Bound      4 carb choices (60 gm/meal);  Low Sodium (2 gm)    Interventions    (X) * Pulmonary catheter absent    (X) Weigh    7/19/2021 10:05 AM EDT by Cory Bound      162 lb    (X) Possible electrolytes    7/19/2021 10:05 AM EDT by Cory Bound      [id number]/L   Potassium4.3mmol/L   [id number]/L       Medications    (X) Diuretics    7/19/2021 10:05 AM EDT by Wilcox Bound      bumex 1mg iv bid    (X) Beta-blocker    7/19/2021 10:05 AM EDT by Wilcox Bound      coreg 6.25mg po  bid    * Milestone   Additional Notes   7/17 Day 2  Intermediate care unit      *MEDS  bumetanide 1 mg Intravenous BID    · carvedilol 6.25 mg Oral BID WC   · spironolactone 25 mg Oral Daily   · insulin glargine 35 Units Subcutaneous QAM   · sodium chloride flush 5-40 mL Intravenous 2 times per day   · enoxaparin 40 mg Subcutaneous Daily   · insulin lispro 0-12 Units Subcutaneous TID WC   · insulin lispro 0-6 Units Subcutaneous Nightly   Zofran 4mg iv prn x1      *Internal MD not   Leg swelling significantly improved   No PND and orthopnea   ASSESSMENT AND PLAN:   1) Acute on chronic systolic CHF/dilated cardiomyopathy   -Due to Ischemic versus nonischemic cardiomyopathy   -TTE from the referring hospital showed EF of 10 to 15% with moderate mitral regurgitation   -Status post Lexiscan in [facility] which showed extensive anteroseptal septal thinning with EF of 18%.  No ischemia   -Status post lifevest placement   -Symptoms improved with diuresis   -Cardiology on board, started on carvedilol 6.25 mg twice daily, Aldactone 25 daily and Entresto to be started tomorrow after waiting more than 36 hours of lisinopril wash off   -Discussed with patient about the need for LHC during this admission; he agreed               2) DM type 2   -Continue basal, supplemental and prandial insulin coverage   -Farxiga to be started as OP due to additive benefit in patients with heart failure       3) Nicotine use disorder   -Counseled to quit         *Cardio consult   IMPRESSION:   #1.  Acute systolic heart failure-was on IV bumetanide 2 mg twice daily at [facility] with unknown diuresis.  Change to IV bumetanide 1 mg IV twice daily.  Strict urine output and daily weights.  Chest x-ray this morning.       #2. Savannah Renee cardiomyopathy-change metoprolol to carvedilol 6.25 mg twice daily.  Start Entresto 24/26 mg twice daily tomorrow, as he will have been off of lisinopril for 36 hours.  Start spironolactone 25 mg daily at noon. Chapito Speak as outpatient. Danielle Juarez will need right and left heart catheterization while admitted.       #3.  Hypertension-controlled.  Medication changes as above.
